# Patient Record
Sex: FEMALE | Race: BLACK OR AFRICAN AMERICAN | Employment: FULL TIME | URBAN - METROPOLITAN AREA
[De-identification: names, ages, dates, MRNs, and addresses within clinical notes are randomized per-mention and may not be internally consistent; named-entity substitution may affect disease eponyms.]

---

## 2017-04-18 ENCOUNTER — OFFICE VISIT (OUTPATIENT)
Dept: ORTHOPEDIC SURGERY | Age: 52
End: 2017-04-18

## 2017-04-18 VITALS
SYSTOLIC BLOOD PRESSURE: 147 MMHG | HEIGHT: 62 IN | WEIGHT: 216 LBS | TEMPERATURE: 97.4 F | BODY MASS INDEX: 39.75 KG/M2 | DIASTOLIC BLOOD PRESSURE: 92 MMHG | HEART RATE: 83 BPM

## 2017-04-18 DIAGNOSIS — S86.002A ACHILLES TENDON INJURY, LEFT, INITIAL ENCOUNTER: Primary | ICD-10-CM

## 2017-04-18 RX ORDER — MELOXICAM 15 MG/1
15 TABLET ORAL
Qty: 30 TAB | Refills: 0 | Status: SHIPPED | OUTPATIENT
Start: 2017-04-18 | End: 2017-04-19 | Stop reason: SDUPTHER

## 2017-04-18 NOTE — PROGRESS NOTES
AMBULATORY PROGRESS NOTE      Patient: Oj Hernandez             MRN: [id number]     SSN: [id number] Body mass index is 39.51 kg/(m^2). YOB: 1965     AGE: 45 y.o. EX: female    PCP: [employer], DO    IMPRESSION/DIAGNOSIS AND TREATMENT PLAN     DIAGNOSES  1. Achilles tendon injury, left, initial encounter        Orders Placed This Encounter    AMB SUPPLY ORDER    [20212] Ankle Min 3V    REFERRAL TO PHYSICAL THERAPY    meloxicam (MOBIC) 15 mg tablet      Oj Hernandez understands her diagnoses and the proposed plan. Plan:    1) Mobic 15 m PO every day; 30 tablets, 0 refills  2) Referral to physical therapy  3) Patient declined CAM boot until cleared by insurance    RTO - 3 weeks    HPI AND EXAMINATION     Oj Hernandez IS A 45 y.o. female who presents to my outpatient office complaining of: left foot and ankle pain. Patient states that for the past two weeks she has been having pain and swelling through the left Achilles' tendon. She rates her pain today 8/10. Patient notes that she has had plantar fasciitis in the past, and she inquired if her current pain was related to that condition. Oj Hernandez is alert/oriented (name, location, time) and follows commands well. she  is in no acute distress and her affect and mood are appropriate. Left ACHILLES GASTROCNEMIUS COMPLEX,PLANTAR FASCIA    Gait: slow with limp  Tenderness:  Yes to the non insertional region of  Achilles tendon sheath Insertional point    YES moderate tenderness to noninsertional Achilles tendon tenderness   Calf tenderness: Absent for calf or gastrocnemius muscle regions   Soft, supple, non tender, non taut lower extremity compartments   NONE to Medial Malleolar, 4/5 Met base midfoot, achilles, tib post, or   NONE to syndesmosis.          no to plantar fascia, or central calcaneal region  Deformity/Swelling:  NO  Insertional point of Distal Achilles Tendon:    YES Fusiform noninsertional focal tendinopathy   NO Comfort's Deformity Present  Cutaneous: No rashes, skin patches, wounds, or abrasions to the lower legs           Warm and Normal color. No regions of expressible drainage. Medial Border of Tibia Region: absent           Skin color, texture, turgor normal. Normal.  Joint Motion: ROM Ankle: Decreased , Hindfoot: (ST,TN,CC) Decreased, Forefoot toes: Normal  Neurologic Exam: Neuro: Motor: normal 5/5 strength in all tested muscle groups and Sensory: no sensory deficits noted. No abnormal hand/wrist, foot/ankle, or facial/neck tremors. Contractures: Gastrocnemius or Achilles Contractures absent. Joint / Tendon Stability:    No anterolateral or varus instability of the Ankle or Subtalar Joints              No peroneal tendon instability present with ankle circumduction  Alignment:  Normal Foot Alignment and  Flexible  Vascular: Normal Pulses/ NL Capillary refill, No evidence of DVT seen on physical exam.   No calf swelling, no tenderness to calf. Varicosities Lower Limbs: None  Lymphatic:  No Evidence of Lymphedema    CHART REVIEW     Past Medical History:   Diagnosis Date    Allergic rhinitis 4/30/2010    Anemia 4/30/2010    Anemia NEC     Asthma 4/30/2010    Chest pain, unspecified     GERD, CHEST WALL PAINS    Edema     R/O CHF, PULMONARY HTN     Family history of colon cancer     Mother     GERD (gastroesophageal reflux disease) 4/30/2010    History of breast lump/mass excision 4/30/2010    HTN (hypertension) 4/30/2010    Insomnia 4/30/2010    Migraine 4/30/2010    Mitral valve insufficiency and aortic valve insufficiency     TRACE TO MILD AI, MILD MR    Obesity     S/P colonoscopy 5/28/2010    S/P tonsillectomy 5/28/2010    Vitamin D deficiency 4/30/2010     Current Outpatient Prescriptions   Medication Sig    meloxicam (MOBIC) 15 mg tablet Take 1 Tab by mouth daily (with breakfast).  losartan (COZAAR) 100 mg tablet Take 1 Tab by mouth daily.     NIFEdipine ER (NIFEDICAL XL) 60 mg ER tablet Take 1 Tab by mouth daily.  topiramate (TOPAMAX) 25 mg tablet Take 1 Tab by mouth two (2) times a day.  fluticasone (VERAMYST) 27.5 mcg/actuation nasal spray 2 Sprays by Both Nostrils route daily.  cholecalciferol, vitamin D3, (VITAMIN D3) 2,000 unit tab Take 2,000 Units by mouth daily.  cetirizine (ZYRTEC) 10 mg tablet Take 1 Tab by mouth daily.  oxymetazoline (AFRIN) 0.05 % nasal spray 2 Sprays two (2) times a day.  aspirin 81 mg chewable tablet Take 81 mg by mouth daily.  albuterol (PROVENTIL, VENTOLIN) 90 mcg/actuation inhaler Take 2 Puffs by inhalation every six (6) hours as needed for Wheezing.  ondansetron hcl (ZOFRAN, AS HYDROCHLORIDE,) 8 mg tablet Take 1 Tab by mouth every eight (8) hours as needed for Nausea.  furosemide (LASIX) 20 mg tablet Take 0.5 Tabs by mouth daily.  FLAXSEED OIL PO Take  by mouth daily.  DOCOSAHEXANOIC ACID/EPA (FISH OIL PO) Take  by mouth daily.  zolpidem CR (AMBIEN CR) 12.5 mg tablet Take 1 Tab by mouth nightly as needed. No current facility-administered medications for this visit. Allergies   Allergen Reactions    Lisinopril Cough    Other Medication Other (comments)     Anesthesia-Had a hard time coming out of Anesthesia     Past Surgical History:   Procedure Laterality Date    HX BREAST BIOPSY  5-18-11    Left    HX TONSIL AND ADENOIDECTOMY       Social History     Occupational History    Not on file.      Social History Main Topics    Smoking status: Never Smoker    Smokeless tobacco: Never Used    Alcohol use Yes      Comment: light    Drug use: Yes     Special: Prescription, OTC    Sexual activity: Not on file     Family History   Problem Relation Age of Onset    Colon Cancer Mother     Hypertension Mother 52    Cancer Mother 62     Colon    Cancer Father      liver    Hypertension Father     Cancer Maternal Grandmother      stomach    Other Brother      sacoidosis    Hypertension Brother     Heart Disease Brother 43    Diabetes Brother 48    Heart Disease Sister      CAD/ICH    Breast Cancer Sister     Ovarian Cancer Sister     Breast Cancer Maternal Aunt        REVIEW OF SYSTEMS : 4/18/2017  ALL BELOW ARE Negative except : SEE HPI       Constitutional: Negative for fever, chills and weight loss. Neg Weigh Loss  Cardiovascular: Negative for chest pain, claudication and leg swelling. SOB, SALAZAR   Gastrointestinal: Negative for  pain, N/V/D/C, Blood in stool or urine,dysuria, hematuria,        Incontinence, pelvic pain  Musculoskeletal: see HPI. Neurological: Negative for dizziness and weakness. Negative for headaches,Visual Changes, Confusion, Seizures,   Psychiatric/Behavioral: Negative for depression, memory loss and substance abuse. Extremities:  Negative for  hair changes, rash or skin lesion changes. Hematologic: Negative for Bleeding problems, bruising, pallor or swollen lymph nodes. Peripheral Vascular: No calf pain, vascular vein tenderness to calf pain              No calf throbbing, posterior knee throbbing pain    DIAGNOSTIC IMAGING     ANKLE X RAYS 3 VIEWS Left   4/18/2017    NON WEIGHTBEARING X-RAYS     X RAYS AT [address] [place] [address]  4/18/2017    Bones: No fractures or dislocations. No focal osteolytic or osteoblastic process     Bone Spurs: No significant bone spurs  Alignment: Ankle mortise alignment is congruent, Tibial plafond and talar dome intact              No Osteochondral defects seen   Joint: No Significant OA changes present  Soft Tissues: Normal, No radiopaque foreign body     No abnormal calcific densities to soft tissues    No ankle joint effusion in lateral projection. Mineralization: Suggests no Osteopenia    I have personally reviewed the results of the above study.  The interpretation of this study is my professional opinion           Written by Umair Ruff, as dictated by Payal Sykes MD. IDr., Nils Loving MD, confirm that all documentation is accurate.

## 2017-04-18 NOTE — PROCEDURES
ANKLE X RAYS 3 VIEWS Left   4/18/2017    NON WEIGHTBEARING X-RAYS     X RAYS AT [address] [place] [address]  4/18/2017    Bones: No fractures or dislocations. No focal osteolytic or osteoblastic process     Bone Spurs: No significant bone spurs  Alignment: Ankle mortise alignment is congruent, Tibial plafond and talar dome intact              No Osteochondral defects seen   Joint: No Significant OA changes present  Soft Tissues: Normal, No radiopaque foreign body     No abnormal calcific densities to soft tissues    No ankle joint effusion in lateral projection. Mineralization: Suggests no Osteopenia    I have personally reviewed the results of the above study.  The interpretation of this study is my professional opinion

## 2017-04-18 NOTE — PATIENT INSTRUCTIONS
Please follow up in 3 weeks. You are advised to contact us if your condition worsens. Achilles Tendon: Exercises  Your Care Instructions  Here are some examples of exercises for your achilles tendon. Start each exercise slowly. Ease off the exercise if you start to have pain. Your doctor or physical therapist will tell you when you can start these exercises and which ones will work best for you. How to do the exercises  Toe stretch    1. Sit in a chair, and extend your affected leg so that your heel is on the floor. 2. With your hand, reach down and pull your big toe up and back. Pull toward your ankle and away from the floor. 3. Hold the position for at least 15 to 30 seconds. 4. Repeat 2 to 4 times a session, several times a day. Calf-plantar fascia stretch    1. Sit with your legs extended and knees straight. 2. Place a towel around your foot just under the toes. 3. Hold each end of the towel in each hand, with your hands above your knees. 4. Pull back with the towel so that your foot stretches toward you. 5. Hold the position for at least 15 to 30 seconds. 6. Repeat 2 to 4 times a session, up to 5 sessions a day. Floor stretch    1. Stand about 2 feet from a wall, and place your hands on the wall at about shoulder height. Or you can stand behind a chair, placing your hands on the back of it for balance. 2. Step back with the leg you want to stretch. Keep the leg straight, and press your heel into the floor with your toe turned slightly in.  3. Lean forward, and bend your other leg slightly. Feel the stretch in the Achilles tendon of your back leg. Hold for at least 15 to 30 seconds. 4. Repeat 2 to 4 times a session, up to 5 sessions a day. Stair stretch    1. Stand with the balls of both feet on the edge of a step or curb (or a medium-sized phone book). With at least one hand, hold onto something solid for balance, such as a banister or handrail.   2. Keeping your affected leg straight, slowly let that heel hang down off of the step or curb until you feel a stretch in the back of your calf and/or Achilles area. Some of your weight should still be on the other leg. 3. Hold this position for at least 15 to 30 seconds. 4. Repeat 2 to 4 times a session, up to 5 times a day or whenever your Achilles tendon starts to feel tight. This stretch can also be done with your knee slightly bent. Strength exercise    This exercise will get you started on building strength after an Achilles tendon injury. Your doctor or physical therapist can help you move on to more challenging exercises as you heal and get stronger. 1. Stand on a step with your heel off the edge of the step. Hold on to a handrail or wall for balance. 2. Push up on your toes, then slowly count to 10 as you lower yourself back down until your heel is below the step. If it hurts to push up on your toes, try putting most of your weight on your other foot as you push up, or try using your arms to help you. If you can't do this exercise without causing pain, stop the exercise and talk to your doctor. 3. Repeat the exercise 8 to 12 times, half with the knee straight and half with the knee bent. Follow-up care is a key part of your treatment and safety. Be sure to make and go to all appointments, and call your doctor if you are having problems. It's also a good idea to know your test results and keep a list of the medicines you take. Where can you learn more? Go to http://kenyatta-alisa.info/. Enter Q362 in the search box to learn more about \"Achilles Tendon: Exercises. \"  Current as of: May 23, 2016  Content Version: 11.2  © 1845-7169 [employer]. Care instructions adapted under license by [employer] (which disclaims liability or warranty for this information).  If you have questions about a medical condition or this instruction, always ask your healthcare professional. Ryder Cheung any warranty or liability for your use of this information.

## 2017-04-18 NOTE — MR AVS SNAPSHOT
Visit Information Date & Time Provider Department Dept. Phone Encounter #  
 4/18/2017  2:00 PM Sudha Millard MD [facility] [phone] [id number] Upcoming Health Maintenance Date Due Hepatitis C Screening 1965 Pneumococcal 19-64 Medium Risk (1 of 1 - PPSV23) 4/10/1982 DTaP/Tdap/Td series (1 - Tdap) 4/10/1984 FOBT Q 1 YEAR AGE 50-75 4/10/2013 PAP AKA CERVICAL CYTOLOGY 12/9/2015 INFLUENZA AGE 9 TO ADULT 8/1/2016 BREAST CANCER SCRN MAMMOGRAM 5/9/2018 Allergies as of 4/18/2017  Review Complete On: 4/18/2017 By: Henna Valverde Severity Noted Reaction Type Reactions Lisinopril  04/30/2010    Cough Other Medication    Other (comments) Anesthesia-Had a hard time coming out of Anesthesia Current Immunizations  Never Reviewed Name Date Influenza Vaccine PF 1/21/2013 Influenza Vaccine Split 10/19/2010 Not reviewed this visit You Were Diagnosed With   
  
 Codes Comments Achilles tendon injury, left, initial encounter    -  Primary ICD-10-CM: J29.727C ICD-9-CM: 257. 7 Vitals BP Pulse Temp Height(growth percentile) Weight(growth percentile) BMI  
 (!) 147/92 83 97.4 °F (36.3 °C) (Oral) 5' 2\" (1.575 m) 216 lb (98 kg) 39.51 kg/m2 OB Status Smoking Status Having regular periods Never Smoker BMI and BSA Data Body Mass Index Body Surface Area  
 39.51 kg/m 2 2.07 m 2 Preferred Pharmacy Pharmacy Name Phone [address] [facility] [phone] Your Updated Medication List  
  
   
This list is accurate as of: 4/18/17  3:09 PM.  Always use your most recent med list.  
  
  
  
  
 AFRIN (OXYMETAZOLINE) 0.05 % nasal spray Generic drug:  oxymetazoline 2 Sprays two (2) times a day. albuterol 90 mcg/actuation inhaler Commonly known as:  Bulmaro Banegas  
 Take 2 Puffs by inhalation every six (6) hours as needed for Wheezing. aspirin 81 mg chewable tablet Take 81 mg by mouth daily. cetirizine 10 mg tablet Commonly known as:  ZYRTEC Take 1 Tab by mouth daily. FISH OIL PO Take  by mouth daily. FLAXSEED OIL PO Take  by mouth daily. fluticasone 27.5 mcg/actuation nasal spray Commonly known as:  VERAMYST  
2 Sprays by Both Nostrils route daily. furosemide 20 mg tablet Commonly known as:  LASIX Take 0.5 Tabs by mouth daily. losartan 100 mg tablet Commonly known as:  COZAAR Take 1 Tab by mouth daily. NIFEdipine ER 60 mg ER tablet Commonly known as:  NIFEDICAL XL Take 1 Tab by mouth daily. ondansetron hcl 8 mg tablet Commonly known as:  ZOFRAN (AS HYDROCHLORIDE) Take 1 Tab by mouth every eight (8) hours as needed for Nausea. topiramate 25 mg tablet Commonly known as:  TOPAMAX Take 1 Tab by mouth two (2) times a day. VITAMIN D3 2,000 unit Tab Generic drug:  cholecalciferol (vitamin D3) Take 2,000 Units by mouth daily. zolpidem CR 12.5 mg tablet Commonly known as:  AMBIEN CR Take 1 Tab by mouth nightly as needed. We Performed the Following AMB POC XRAY, ANKLE; COMPLETE, 3+ VIE [16823 CPT(R)] AMB SUPPLY ORDER [[id number] Custom] Comments:  
 Order date: 4/18/2017 Left short CAM boot REFERRAL TO PHYSICAL THERAPY [HXZ80 Custom] Comments:  
 Evaluation and treatment of the left Achilles' tendon two to three times a week four four weeks. Please utilize low frequency ultrasound and gastrocnemius stretching. Referral Information Referral ID Referred By Referred To  
  
 [id number] ZORAIDA DAVE [address]   
   [address] [place] [address] [address] [place], [address] [facility] Phone: [phone] Fax: [phone] Visits Status Start Date End Date 1 New Request 4/18/17 4/18/18 If your referral has a status of pending review or denied, additional information will be sent to support the outcome of this decision. Patient Instructions Please follow up in 3 weeks. You are advised to contact us if your condition worsens. Achilles Tendon: Exercises Your Care Instructions Here are some examples of exercises for your achilles tendon. Start each exercise slowly. Ease off the exercise if you start to have pain. Your doctor or physical therapist will tell you when you can start these exercises and which ones will work best for you. How to do the exercises Toe stretch 1. Sit in a chair, and extend your affected leg so that your heel is on the floor. 2. With your hand, reach down and pull your big toe up and back. Pull toward your ankle and away from the floor. 3. Hold the position for at least 15 to 30 seconds. 4. Repeat 2 to 4 times a session, several times a day. Calf-plantar fascia stretch 1. Sit with your legs extended and knees straight. 2. Place a towel around your foot just under the toes. 3. Hold each end of the towel in each hand, with your hands above your knees. 4. Pull back with the towel so that your foot stretches toward you. 5. Hold the position for at least 15 to 30 seconds. 6. Repeat 2 to 4 times a session, up to 5 sessions a day. Floor stretch 1. Stand about 2 feet from a wall, and place your hands on the wall at about shoulder height. Or you can stand behind a chair, placing your hands on the back of it for balance. 2. Step back with the leg you want to stretch. Keep the leg straight, and press your heel into the floor with your toe turned slightly in. 
3. Lean forward, and bend your other leg slightly. Feel the stretch in the Achilles tendon of your back leg. Hold for at least 15 to 30 seconds. 4. Repeat 2 to 4 times a session, up to 5 sessions a day. Stair stretch 1. Stand with the balls of both feet on the edge of a step or curb (or a medium-sized phone book). With at least one hand, hold onto something solid for balance, such as a banister or handrail. 2. Keeping your affected leg straight, slowly let that heel hang down off of the step or curb until you feel a stretch in the back of your calf and/or Achilles area. Some of your weight should still be on the other leg. 3. Hold this position for at least 15 to 30 seconds. 4. Repeat 2 to 4 times a session, up to 5 times a day or whenever your Achilles tendon starts to feel tight. This stretch can also be done with your knee slightly bent. Strength exercise This exercise will get you started on building strength after an Achilles tendon injury. Your doctor or physical therapist can help you move on to more challenging exercises as you heal and get stronger. 1. Stand on a step with your heel off the edge of the step. Hold on to a handrail or wall for balance. 2. Push up on your toes, then slowly count to 10 as you lower yourself back down until your heel is below the step. If it hurts to push up on your toes, try putting most of your weight on your other foot as you push up, or try using your arms to help you. If you can't do this exercise without causing pain, stop the exercise and talk to your doctor. 3. Repeat the exercise 8 to 12 times, half with the knee straight and half with the knee bent. Follow-up care is a key part of your treatment and safety. Be sure to make and go to all appointments, and call your doctor if you are having problems. It's also a good idea to know your test results and keep a list of the medicines you take. Where can you learn more? Go to http://kenyatta-alisa.info/. Enter S130 in the search box to learn more about \"Achilles Tendon: Exercises. \" Current as of: May 23, 2016 Content Version: 11.2 © 2506-0233 [employer].  Care instructions adapted under license by [employer] (which disclaims liability or warranty for this information). If you have questions about a medical condition or this instruction, always ask your healthcare professional. [address] any warranty or liability for your use of this information. Introducing [address]! Dear Evaristo Zuleta: 
Thank you for requesting a [employer] account. Our records indicate that you already have an active [employer] account. You can access your account anytime at [email or web address] Did you know that you can access your hospital and ER discharge instructions at any time in [employer]? You can also review all of your test results from your hospital stay or ER visit. Additional Information If you have questions, please visit the Frequently Asked Questions section of the [employer] website at [email or web address] Remember, [employer] is NOT to be used for urgent needs. For medical emergencies, dial 911. Now available from your iPhone and Android! Please provide this summary of care documentation to your next provider. Your primary care clinician is listed as [employer]. If you have any questions after today's visit, please call [phone].

## 2017-04-19 ENCOUNTER — HOSPITAL ENCOUNTER (OUTPATIENT)
Dept: PHYSICAL THERAPY | Age: 52
Discharge: HOME | End: 2017-04-19
Payer: OTHER GOVERNMENT

## 2017-04-19 ENCOUNTER — TELEPHONE (OUTPATIENT)
Dept: ORTHOPEDIC SURGERY | Age: 52
End: 2017-04-19

## 2017-04-19 PROCEDURE — 97140 MANUAL THERAPY 1/> REGIONS: CPT

## 2017-04-19 PROCEDURE — 97162 PT EVAL MOD COMPLEX 30 MIN: CPT

## 2017-04-19 RX ORDER — MELOXICAM 15 MG/1
15 TABLET ORAL DAILY
Qty: 30 TAB | Refills: 0 | Status: CANCELLED | OUTPATIENT
Start: 2017-04-19

## 2017-04-19 RX ORDER — MELOXICAM 15 MG/1
15 TABLET ORAL DAILY
Qty: 30 TAB | Refills: 0 | Status: SHIPPED | OUTPATIENT
Start: 2017-04-19 | End: 2019-04-02

## 2017-04-19 NOTE — PROGRESS NOTES
[facility]  [address]  [place], [address]  [phone] fax    Plan of Care/ Statement of Necessity for Physical Therapy Services    Patient name: Linda Schultz Start of Care: 2017   Referral source: Prudencio Bolton MD : 1965    Medical Diagnosis: Unspecified injury of left Achilles tendon, initial encounter [S86.002A]   Onset Date:2 weeks    Treatment Diagnosis: L achilles tendinitis    Prior Hospitalization: see medical history Provider#: [id number]   Medications: Verified on Patient summary List    Comorbidities: HTN, back pain, headaches   Prior Level of Function: functionally independent with all activities     The Plan of Care and following information is based on the information from the initial evaluation. Assessment/ key information: 44 y/o female presents with signs and symptoms consistent with L achilles tendinitis. Pt demonstrates B pes planus with B over-pronation. Pt demonstrates gastroc tightness with myofascial restrictions. Decreased B ankle DF, decreased strength L PF with pain, IV and EV. + TTP noted throughout the L achilles and lateral gastroc head. Pt will benefit from PT to address the aforementioned impairments. Evaluation Complexity History MEDIUM  Complexity : 1-2 comorbidities / personal factors will impact the outcome/ POC ; Examination MEDIUM Complexity : 3 Standardized tests and measures addressing body structure, function, activity limitation and / or participation in recreation  ;Presentation MEDIUM Complexity : Evolving with changing characteristics  ; Clinical Decision Making MEDIUM Complexity : FOTO score of 26-74  Overall Complexity Rating: MEDIUM  Problem List: pain affecting function, decrease ROM, decrease strength, impaired gait/ balance, decrease ADL/ functional abilitiies, decrease activity tolerance and decrease flexibility/ joint mobility   Treatment Plan may include any combination of the following: Therapeutic exercise, Therapeutic activities, Neuromuscular re-education, Physical agent/modality, Manual therapy, Patient education and Self Care training  Patient / Family readiness to learn indicated by: asking questions and trying to perform skills  Persons(s) to be included in education: patient (P)  Barriers to Learning/Limitations: None  Patient Goal (s): To get rid of the pain  Patient Self Reported Health Status: good  Rehabilitation Potential: good    Short Term Goals: To be accomplished in 1 weeks:   1. Pt will be I and compliant with HEP  Long Term Goals: To be accomplished in 4 weeks:   1. Pt will demonstrate 5/5 gross ankle strength without increase in pain for improve ability for daily tasks   2. Improve L ankle DF to 5 degrees for improved ability for gait   3. Pt will report being able to walk between rooms at home without difficulty. 4. Pt will report at least 75% improvement for improved ability for functional tasks  Frequency / Duration: Patient to be seen 2 times per week for 4 weeks. Patient/ Caregiver education and instruction: Diagnosis, prognosis, self care, activity modification and exercises   [x]  Plan of care has been reviewed with PTA    Mayra Hyman, PT 4/19/2017 3:28 PM    ________________________________________________________________________     I certify that the above Therapy Services are being furnished while the patient is under my care. I agree with the treatment plan and certify that this therapy is necessary.     [de-identified] Signature:____________________  Date:____________Time: _________    Please sign and return to [facility]  [address]  [place], [address]  [phone] fax

## 2017-04-19 NOTE — PROGRESS NOTES
PT DAILY TREATMENT NOTE     Patient Name: Trevor Whitney  Date:2017  : 1965  [x]  Patient  Verified  Payor: Kirill Stuton / Plan: Cony Falconu / Product Type: PPO /    In time:2:40  Out time:3:20  Total Treatment Time (min): 40  Visit #: 1 of 8    Treatment Area: Unspecified injury of left Achilles tendon, initial encounter [S86.002A]    SUBJECTIVE  Pain Level (0-10 scale): 7  Any medication changes, allergies to medications, adverse drug reactions, diagnosis change, or new procedure performed?: [x] No    [] Yes (see summary sheet for update)  Subjective functional status/changes:   [] No changes reported       OBJECTIVE    Modality rationale:    Min Type Additional Details    [] Estim:  []Unatt       []IFC  []Premod                        []Other:  []w/ice   []w/heat  Position:  Location:    [] Estim: []Att    []TENS instruct  []NMES                    []Other:  []w/US   []w/ice   []w/heat  Position:  Location:    []  Traction: [] Cervical       []Lumbar                       [] Prone          []Supine                       []Intermittent   []Continuous Lbs:  [] before manual  [] after manual    []  Ultrasound: []Continuous   [] Pulsed                           []1MHz   []3MHz W/cm2:  Location:    []  Iontophoresis with dexamethasone         Location: [] Take home patch   [] In clinic    []  Ice     []  heat  []  Ice massage  []  Laser   []  Anodyne Position:  Location:    []  Laser with stim  []  Other:  Position:  Location:    []  Vasopneumatic Device Pressure:       [] lo [] med [] hi   Temperature: [] lo [] med [] hi   [] Skin assessment post-treatment:  []intact []redness- no adverse reaction    []redness - adverse reaction:     30 min [x]Eval                  []Re-Eval       10 min Manual Therapy:  Graston L gastroc and achilles   Rationale: decrease pain, increase ROM and increase tissue extensibility to perform functional tasks       With   [] TE   [] TA   [] neuro   [] other: Patient Education: [x] Review HEP    [] Progressed/Changed HEP based on:   [] positioning   [] body mechanics   [] transfers   [] heat/ice application    [] other:      Other Objective/Functional Measures:       Pain Level (0-10 scale) post treatment: 7    ASSESSMENT/Changes in Function:      Patient will continue to benefit from skilled PT services to modify and progress therapeutic interventions, address functional mobility deficits, address ROM deficits, address strength deficits, analyze and address soft tissue restrictions and analyze and cue movement patterns to attain remaining goals.      [x]  See Plan of Care  []  See progress note/recertification  []  See Discharge Summary         Progress towards goals / Updated goals:  Per POC    PLAN  []  Upgrade activities as tolerated     [x]  Continue plan of care  []  Update interventions per flow sheet       []  Discharge due to:_  []  Other:_      Mayra Browning, PT 4/19/2017  3:25 PM    Future Appointments  Date Time Provider Lakeshia Chatterjee   4/25/2017 4:30 PM Salvador Hamilton PTA [facility]   4/28/2017 5:00 PM Mayra Browning PT [facility]   5/17/2017 1:40 PM [facility]

## 2017-04-19 NOTE — TELEPHONE ENCOUNTER
Patient called to request we send the meloxicam (MOBIC) 15 mg tablet rx to [facility] pharmacy on file ([address]). She no longer uses mail order.   Please advise patient when done at [phone]

## 2017-05-08 ENCOUNTER — APPOINTMENT (OUTPATIENT)
Dept: PHYSICAL THERAPY | Age: 52
End: 2017-05-08
Payer: OTHER GOVERNMENT

## 2017-05-10 ENCOUNTER — HOSPITAL ENCOUNTER (OUTPATIENT)
Dept: PHYSICAL THERAPY | Age: 52
Discharge: HOME | End: 2017-05-10
Payer: OTHER GOVERNMENT

## 2017-05-10 PROCEDURE — 97035 APP MDLTY 1+ULTRASOUND EA 15: CPT

## 2017-05-10 PROCEDURE — 97110 THERAPEUTIC EXERCISES: CPT

## 2017-05-10 NOTE — PROGRESS NOTES
PT DAILY TREATMENT NOTE     Patient Name: Meliton Vásquez  Date:5/10/2017  : 1965  [x]  Patient  Verified  Payor: Julianna Lombard / Plan: Antoinette Dobson / Product Type: PPO /    In time:5:09  Out time:5:37  Total Treatment Time (min): 28  Visit #: 2 of 8    Treatment Area: Unspecified injury of left Achilles tendon, initial encounter [S86.002A]    SUBJECTIVE  Pain Level (0-10 scale): 8/10  Any medication changes, allergies to medications, adverse drug reactions, diagnosis change, or new procedure performed?: [x] No    [] Yes (see summary sheet for update)  Subjective functional status/changes:   [x] No changes reported    OBJECTIVE    Modality rationale: decrease inflammation, decrease pain and increase tissue extensibility to improve the patients ability to perform ADL's.    Min Type Additional Details    [] Estim:  []Unatt       []IFC  []Premod                        []Other:  []w/ice   []w/heat  Position:  Location:    [] Estim: []Att    []TENS instruct  []NMES                    []Other:  []w/US   []w/ice   []w/heat  Position:  Location:    []  Traction: [] Cervical       []Lumbar                       [] Prone          []Supine                       []Intermittent   []Continuous Lbs:  [] before manual  [] after manual   8 [x]  Ultrasound: [x]Continuous   [] Pulsed                           []1MHz   [x]3MHz W/cm2: 1.0  Location:(L) Achilles tendon    []  Iontophoresis with dexamethasone         Location: [] Take home patch   [] In clinic    []  Ice     []  heat  []  Ice massage  []  Laser   []  Anodyne Position:  Location:    []  Laser with stim  []  Other:  Position:  Location:    []  Vasopneumatic Device Pressure:       [] lo [] med [] hi   Temperature: [] lo [] med [] hi   [] Skin assessment post-treatment:  []intact []redness- no adverse reaction    []redness - adverse reaction:     20 min Therapeutic Exercise:  [x] See flow sheet :   Rationale: increase ROM and increase strength to improve the patients ability to perform ADL's. With   [x] TE   [] TA   [] neuro   [] other: Patient Education: [x] Review HEP    [] Progressed/Changed HEP based on:   [] positioning   [] body mechanics   [] transfers   [] heat/ice application    [] other:      Other Objective/Functional Measures: Pt refused warm up on stationary bike, pt stated \"I can't do any exercises because my foot hurts too much, I just want to get the US so it feels better. \" Educated pt regarding the importance of performing the exercises. Pt reports she has had cramps in her calf for several years, never sought out treatment or self treated the cramps/spasms. Pain Level (0-10 scale) post treatment: 5/10    ASSESSMENT/Changes in Function: First F/U visit since IE on April 19th. Pt reports a decrease in pain after treatment. Patient will continue to benefit from skilled PT services to modify and progress therapeutic interventions, address functional mobility deficits, address ROM deficits, address strength deficits, analyze and address soft tissue restrictions and analyze and modify body mechanics/ergonomics to attain remaining goals. [x]  See Plan of Care  []  See progress note/recertification  []  See Discharge Summary         Progress towards goals / Updated goals:  Short Term Goals: To be accomplished in 1 weeks:  1. Pt will be I and compliant with HEP - Pt reports compliance. 5/10/2017  Long Term Goals: To be accomplished in 4 weeks:  1. Pt will demonstrate 5/5 gross ankle strength without increase in pain for improve ability for daily tasks  2. Improve L ankle DF to 5 degrees for improved ability for gait  3. Pt will report being able to walk between rooms at home without difficulty. 4. Pt will report at least 75% improvement for improved ability for functional tasks  Frequency / Duration: Patient to be seen 2 times per week for 4 weeks.     PLAN  []  Upgrade activities as tolerated     [x]  Continue plan of care  [] Update interventions per flow sheet       []  Discharge due to:_  []  Other:_      Geovanni Urbina, PTA 5/10/2017  5:18 PM    Future Appointments  Date Time Provider Lakeshia Chatterjee   5/11/2017 2:30 PM [facility] RONNIE MARQUIS  [facility]   5/17/2017 1:40 PM MD Hanny Nava Farooq 67

## 2017-05-11 ENCOUNTER — HOSPITAL ENCOUNTER (OUTPATIENT)
Dept: MAMMOGRAPHY | Age: 52
Discharge: HOME | End: 2017-05-11
Attending: FAMILY MEDICINE
Payer: OTHER GOVERNMENT

## 2017-05-11 DIAGNOSIS — Z12.31 VISIT FOR SCREENING MAMMOGRAM: ICD-10-CM

## 2017-05-11 PROCEDURE — 77063 BREAST TOMOSYNTHESIS BI: CPT

## 2017-05-15 ENCOUNTER — HOSPITAL ENCOUNTER (OUTPATIENT)
Dept: PHYSICAL THERAPY | Age: 52
Discharge: HOME | End: 2017-05-15
Payer: OTHER GOVERNMENT

## 2017-05-15 PROCEDURE — 97140 MANUAL THERAPY 1/> REGIONS: CPT

## 2017-05-15 PROCEDURE — 97110 THERAPEUTIC EXERCISES: CPT

## 2017-05-15 PROCEDURE — 97035 APP MDLTY 1+ULTRASOUND EA 15: CPT

## 2017-05-15 NOTE — PROGRESS NOTES
PT DAILY TREATMENT NOTE     Patient Name: Linda Schultz  Date:5/15/2017  : 1965  [x]  Patient  Verified  Payor: Brian Troncoso / Plan: Rama Gregory / Product Type: PPO /    In time:5:06  Out time:5:40  Total Treatment Time (min): 34  Visit #: 3 of 8    Treatment Area: Unspecified injury of left Achilles tendon, initial encounter [S86.002A]    SUBJECTIVE  Pain Level (0-10 scale): 6/10  Any medication changes, allergies to medications, adverse drug reactions, diagnosis change, or new procedure performed?: [x] No    [] Yes (see summary sheet for update)  Subjective functional status/changes:   [] No changes reported  \"Still hurts, the pain is constant. \"    OBJECTIVE    Modality rationale: decrease inflammation, decrease pain and increase tissue extensibility to improve the patients ability to perform ADL's.    Min Type Additional Details    [] Estim:  []Unatt       []IFC  []Premod                        []Other:  []w/ice   []w/heat  Position:  Location:    [] Estim: []Att    []TENS instruct  []NMES                    []Other:  []w/US   []w/ice   []w/heat  Position:  Location:    []  Traction: [] Cervical       []Lumbar                       [] Prone          []Supine                       []Intermittent   []Continuous Lbs:  [] before manual  [] after manual   8 [x]  Ultrasound: [x]Continuous   [] Pulsed                           []1MHz   [x]3MHz W/cm2: 1.0  Location: (L) Achilles tendon    []  Iontophoresis with dexamethasone         Location: [] Take home patch   [] In clinic    []  Ice     []  heat  []  Ice massage  []  Laser   []  Anodyne Position:  Location:    []  Laser with stim  []  Other:  Position:  Location:    []  Vasopneumatic Device Pressure:       [] lo [] med [] hi   Temperature: [] lo [] med [] hi   [] Skin assessment post-treatment:  []intact []redness- no adverse reaction    []redness - adverse reaction:     18 min Therapeutic Exercise:  [x] See flow sheet :   Rationale: increase ROM, increase strength and increase proprioception to improve the patients ability to perform functional activities. 8 min Manual Therapy:  Graston technique to (L) gastroc, soleus, achilles tendon and plantar fascia. Rationale: decrease pain, increase ROM, increase tissue extensibility and decrease trigger points to improve activity tolerance. With   [x] TE   [] TA   [] neuro   [] other: Patient Education: [x] Review HEP    [] Progressed/Changed HEP based on:   [] positioning   [] body mechanics   [] transfers   [] heat/ice application    [] other:      Other Objective/Functional Measures: AROM (L) Ankle DF 5 degrees. Pain Level (0-10 scale) post treatment: 5/10    ASSESSMENT/Changes in Function: Pt reports limited change, states \"the pain is constant and the mm's feel very tight. \" Pt has had 2 f/u since IE. Improved DF ROM noted. Patient will continue to benefit from skilled PT services to modify and progress therapeutic interventions, address functional mobility deficits, address ROM deficits, address strength deficits, analyze and address soft tissue restrictions and analyze and modify body mechanics/ergonomics to attain remaining goals. [x]  See Plan of Care  []  See progress note/recertification  []  See Discharge Summary         Progress towards goals / Updated goals:  Short Term Goals: To be accomplished in 1 weeks:  1. Pt will be I and compliant with HEP - Pt reports compliance. 5/10/2017  Long Term Goals: To be accomplished in 4 weeks:  1. Pt will demonstrate 5/5 gross ankle strength without increase in pain for improve ability for daily tasks  2. Improve L ankle DF to 5 degrees for improved ability for gait - AROM (L) Ankle DF 5 degrees. 5/15/2017  3. Pt will report being able to walk between rooms at home without difficulty.    4. Pt will report at least 75% improvement for improved ability for functional tasks  Frequency / Duration: Patient to be seen 2 times per week for 4 weeks.     PLAN  []  Upgrade activities as tolerated     [x]  Continue plan of care  []  Update interventions per flow sheet       []  Discharge due to:_  []  Other:_      Foster Rout, PTA 5/15/2017  5:07 PM    Future Appointments  Date Time Provider Lakeshia Chatterjee   5/17/2017 1:40 PM [place] Jain [place] [id number] [facility]   5/17/2017 5:30 PM Foster Rout, PTA [facility]   5/22/2017 3:00 PM Foster Rout, PTA [facility]   5/24/2017 2:30 PM Foster Rout, PTA [facility]   5/31/2017 4:30 PM [place] Filter, PT [facility]   6/2/2017 5:00 PM [place] Filter, PT [facility]

## 2017-05-16 ENCOUNTER — DOCUMENTATION ONLY (OUTPATIENT)
Dept: SURGERY | Age: 52
End: 2017-05-16

## 2017-05-17 ENCOUNTER — APPOINTMENT (OUTPATIENT)
Dept: PHYSICAL THERAPY | Age: 52
End: 2017-05-17
Payer: OTHER GOVERNMENT

## 2017-05-22 ENCOUNTER — APPOINTMENT (OUTPATIENT)
Dept: PHYSICAL THERAPY | Age: 52
End: 2017-05-22
Payer: OTHER GOVERNMENT

## 2017-05-22 ENCOUNTER — HOSPITAL ENCOUNTER (OUTPATIENT)
Dept: ULTRASOUND IMAGING | Age: 52
Discharge: HOME | End: 2017-05-22
Attending: FAMILY MEDICINE
Payer: OTHER GOVERNMENT

## 2017-05-22 DIAGNOSIS — R92.8 ABNORMAL MAMMOGRAM: ICD-10-CM

## 2017-05-22 DIAGNOSIS — N63.0 LUMP OR MASS IN BREAST: ICD-10-CM

## 2017-05-22 PROCEDURE — 76642 ULTRASOUND BREAST LIMITED: CPT

## 2017-05-23 ENCOUNTER — OFFICE VISIT (OUTPATIENT)
Dept: ORTHOPEDIC SURGERY | Age: 52
End: 2017-05-23

## 2017-05-23 VITALS
DIASTOLIC BLOOD PRESSURE: 86 MMHG | BODY MASS INDEX: 39.01 KG/M2 | WEIGHT: 212 LBS | HEIGHT: 62 IN | TEMPERATURE: 97.8 F | SYSTOLIC BLOOD PRESSURE: 137 MMHG | HEART RATE: 80 BPM

## 2017-05-23 DIAGNOSIS — S86.002D ACHILLES TENDON INJURY, LEFT, SUBSEQUENT ENCOUNTER: ICD-10-CM

## 2017-05-23 DIAGNOSIS — M76.62 ACHILLES TENDINITIS OF LEFT LOWER EXTREMITY: Primary | ICD-10-CM

## 2017-05-23 NOTE — PROGRESS NOTES
AMBULATORY PROGRESS NOTE      Patient: Fredis Harper             MRN: [id number]     SSN: [id number] Body mass index is 38.78 kg/(m^2). YOB: 1965     AGE: 45 y.o. EX: female    PCP: Mickie Gonzalez DO    IMPRESSION/DIAGNOSIS AND TREATMENT PLAN     DIAGNOSES  1. Achilles tendinitis of left lower extremity    2. Achilles tendon injury, left, subsequent encounter        Orders Placed This Encounter    MRI ANKLE LT Lakeshia Burns understands her diagnoses and the proposed plan. Plan:    1) MRI: Left ankle; Assess the Achilles' tendon  2) Complete formal physical therapy; add three weeks    RTO - after MRI    HPI AND EXAMINATION     Fredis Harper IS A 45 y.o. female who presents to my outpatient office for follow up of a left Achilles' tendon injury. At last visit, I referred the patient to physical therapy, prescribed Mobic 15 mg, and recommended a CAM boot that was declined by the patient until cleared by insurance. The patient presents to the office today stating that she continues to have left Achilles' tendon pain. She states that three weeks ago she tried to open a window and put to much pressure on her left ankle. The next morning she went to her PCP, because she could not walk. Fredis Harper is alert/oriented (name, location, time) and follows commands well. she is in no acute distress and her affect and mood are appropriate.      Left ACHILLES GASTROCNEMIUS COMPLEX,PLANTAR FASCIA     Gait: slow with limp  Tenderness: Yes to the non insertional region of Achilles tendon sheath Insertional point    YES moderate tenderness to noninsertional Achilles tendon tenderness  Calf tenderness: Absent for calf or gastrocnemius muscle regions   Soft, supple, non tender, non taut lower extremity compartments   NONE to Medial Malleolar, 4/5 Met base midfoot, achilles, tib post, or   NONE to syndesmosis.    no to plantar fascia, or central calcaneal region  Deformity/Swelling:  NO Insertional point of Distal Achilles Tendon:    YES Fusiform noninsertional focal tendinopathy   NO Comfort's Deformity Present  Cutaneous: No rashes, skin patches, wounds, or abrasions to the lower legs   Warm and Normal color. No regions of expressible drainage. Medial Border of Tibia Region: absent   Skin color, texture, turgor normal. Normal.  Joint Motion: ROM Ankle: Decreased , Hindfoot: (ST,TN,CC) Decreased, Forefoot toes: Normal  Neurologic Exam: Neuro: Motor: normal 5/5 strength in all tested muscle groups and  Sensory: no sensory deficits noted. No abnormal hand/wrist, foot/ankle, or facial/neck tremors. Contractures: Gastrocnemius or Achilles Contractures absent. Joint / Tendon Stability:    No anterolateral or varus instability of the Ankle or Subtalar Joints   No peroneal tendon instability present with ankle circumduction  Alignment:  Normal Foot Alignment and  Flexible  Vascular: Normal Pulses/ NL Capillary refill, No evidence of DVT seen on physical exam.   No calf swelling, no tenderness to calf. Varicosities Lower Limbs: None  Lymphatic: No Evidence of Lymphedema    CHART REVIEW     Past Medical History:   Diagnosis Date    Allergic rhinitis 4/30/2010    Anemia 4/30/2010    Anemia NEC     Asthma 4/30/2010    Chest pain, unspecified     GERD, CHEST WALL PAINS    Edema     R/O CHF, PULMONARY HTN     Family history of colon cancer     Mother     GERD (gastroesophageal reflux disease) 4/30/2010    History of breast lump/mass excision 4/30/2010    HTN (hypertension) 4/30/2010    Insomnia 4/30/2010    Migraine 4/30/2010    Mitral valve insufficiency and aortic valve insufficiency     TRACE TO MILD AI, MILD MR    Obesity     S/P colonoscopy 5/28/2010    S/P tonsillectomy 5/28/2010    Vitamin D deficiency 4/30/2010     Current Outpatient Prescriptions   Medication Sig    losartan (COZAAR) 100 mg tablet Take 1 Tab by mouth daily.     NIFEdipine ER (NIFEDICAL XL) 60 mg ER tablet Take 1 Tab by mouth daily.  topiramate (TOPAMAX) 25 mg tablet Take 1 Tab by mouth two (2) times a day.  fluticasone (VERAMYST) 27.5 mcg/actuation nasal spray 2 Sprays by Both Nostrils route daily.  cholecalciferol, vitamin D3, (VITAMIN D3) 2,000 unit tab Take 2,000 Units by mouth daily.  cetirizine (ZYRTEC) 10 mg tablet Take 1 Tab by mouth daily.  oxymetazoline (AFRIN) 0.05 % nasal spray 2 Sprays two (2) times a day.  aspirin 81 mg chewable tablet Take 81 mg by mouth daily.  albuterol (PROVENTIL, VENTOLIN) 90 mcg/actuation inhaler Take 2 Puffs by inhalation every six (6) hours as needed for Wheezing.  meloxicam (MOBIC) 15 mg tablet Take 1 Tab by mouth daily.  ondansetron hcl (ZOFRAN, AS HYDROCHLORIDE,) 8 mg tablet Take 1 Tab by mouth every eight (8) hours as needed for Nausea.  furosemide (LASIX) 20 mg tablet Take 0.5 Tabs by mouth daily.  FLAXSEED OIL PO Take  by mouth daily.  DOCOSAHEXANOIC ACID/EPA (FISH OIL PO) Take  by mouth daily.  zolpidem CR (AMBIEN CR) 12.5 mg tablet Take 1 Tab by mouth nightly as needed. No current facility-administered medications for this visit. Allergies   Allergen Reactions    Lisinopril Cough    Other Medication Other (comments)     Anesthesia-Had a hard time coming out of Anesthesia     Past Surgical History:   Procedure Laterality Date    HX BREAST BIOPSY  5-18-11    Left    HX TONSIL AND ADENOIDECTOMY       Social History     Occupational History    Not on file.      Social History Main Topics    Smoking status: Never Smoker    Smokeless tobacco: Never Used    Alcohol use Yes      Comment: light    Drug use: Yes     Special: Prescription, OTC    Sexual activity: Not on file     Family History   Problem Relation Age of Onset    Colon Cancer Mother     Hypertension Mother 52    Cancer Mother 62     Colon    Cancer Father      liver    Hypertension Father     Cancer Maternal Grandmother stomach    Other Brother      sacoidosis    Hypertension Brother     Heart Disease Brother 43    Diabetes Brother 48    Heart Disease Sister      CAD/ICH    Breast Cancer Sister     Ovarian Cancer Sister     Breast Cancer Maternal Aunt        REVIEW OF SYSTEMS : 5/23/2017  ALL BELOW ARE Negative except : SEE HPI       Constitutional: Negative for fever, chills and weight loss. Neg Weigh Loss  Cardiovascular: Negative for chest pain, claudication and leg swelling. SOB, SALAZAR   Gastrointestinal: Negative for  pain, N/V/D/C, Blood in stool or urine,dysuria, hematuria,        Incontinence, pelvic pain  Musculoskeletal: see HPI. Neurological: Negative for dizziness and weakness. Negative for headaches,Visual Changes, Confusion, Seizures,   Psychiatric/Behavioral: Negative for depression, memory loss and substance abuse. Extremities:  Negative for  hair changes, rash or skin lesion changes. Hematologic: Negative for Bleeding problems, bruising, pallor or swollen lymph nodes. Peripheral Vascular: No calf pain, vascular vein tenderness to calf pain              No calf throbbing, posterior knee throbbing pain    DIAGNOSTIC IMAGING     No notes on file    Written by Elizabeth Dan, as dictated by Sandor Page MD. I, , Sandor Page MD, confirm that all documentation is accurate.

## 2017-05-23 NOTE — MR AVS SNAPSHOT
Visit Information Date & Time Provider Department Dept. Phone Encounter #  
 5/23/2017  2:50 PM Maddy Strong, 27 [facility] [phone] Upcoming Health Maintenance Date Due Hepatitis C Screening 1965 Pneumococcal 19-64 Medium Risk (1 of 1 - PPSV23) 4/10/1982 DTaP/Tdap/Td series (1 - Tdap) 4/10/1984 FOBT Q 1 YEAR AGE 50-75 4/10/2013 PAP AKA CERVICAL CYTOLOGY 12/9/2015 INFLUENZA AGE 9 TO ADULT 8/1/2017 BREAST CANCER SCRN MAMMOGRAM 5/11/2019 Allergies as of 5/23/2017  Review Complete On: 5/23/2017 By: Maddy Strong MD  
  
 Severity Noted Reaction Type Reactions Lisinopril  04/30/2010    Cough Other Medication    Other (comments) Anesthesia-Had a hard time coming out of Anesthesia Current Immunizations  Never Reviewed Name Date Influenza Vaccine PF 1/21/2013 Influenza Vaccine Split 10/19/2010 Not reviewed this visit You Were Diagnosed With   
  
 Codes Comments Achilles tendinitis of left lower extremity    -  Primary ICD-10-CM: M76.62 
ICD-9-CM: 726.71 Achilles tendon injury, left, subsequent encounter     ICD-10-CM: S86.002D ICD-9-CM: V58.89, 959.7 Vitals BP Pulse Temp Height(growth percentile) Weight(growth percentile) BMI  
 137/86 80 97.8 °F (36.6 °C) (Oral) 5' 2\" (1.575 m) 212 lb (96.2 kg) 38.78 kg/m2 OB Status Smoking Status Having regular periods Never Smoker BMI and BSA Data Body Mass Index Body Surface Area 38.78 kg/m 2 2.05 m 2 Preferred Pharmacy Pharmacy Name Phone [id number] Chaparrita Bowden, [address] [facility] [facility] [phone] Your Updated Medication List  
  
   
This list is accurate as of: 5/23/17  3:56 PM.  Always use your most recent med list.  
  
  
  
  
 AFRIN (OXYMETAZOLINE) 0.05 % nasal spray Generic drug:  oxymetazoline 2 Sprays two (2) times a day. albuterol 90 mcg/actuation inhaler Commonly known as:  Janyce [place] Take 2 Puffs by inhalation every six (6) hours as needed for Wheezing. aspirin 81 mg chewable tablet Take 81 mg by mouth daily. cetirizine 10 mg tablet Commonly known as:  ZYRTEC Take 1 Tab by mouth daily. FISH OIL PO Take  by mouth daily. FLAXSEED OIL PO Take  by mouth daily. fluticasone 27.5 mcg/actuation nasal spray Commonly known as:  VERAMYST  
2 Sprays by Both Nostrils route daily. furosemide 20 mg tablet Commonly known as:  LASIX Take 0.5 Tabs by mouth daily. losartan 100 mg tablet Commonly known as:  COZAAR Take 1 Tab by mouth daily. meloxicam 15 mg tablet Commonly known as:  MOBIC Take 1 Tab by mouth daily. NIFEdipine ER 60 mg ER tablet Commonly known as:  NIFEDICAL XL Take 1 Tab by mouth daily. ondansetron hcl 8 mg tablet Commonly known as:  ZOFRAN (AS HYDROCHLORIDE) Take 1 Tab by mouth every eight (8) hours as needed for Nausea. topiramate 25 mg tablet Commonly known as:  TOPAMAX Take 1 Tab by mouth two (2) times a day. VITAMIN D3 2,000 unit Tab Generic drug:  cholecalciferol (vitamin D3) Take 2,000 Units by mouth daily. zolpidem CR 12.5 mg tablet Commonly known as:  AMBIEN CR Take 1 Tab by mouth nightly as needed. To-Do List   
 05/24/2017 2:30 PM  
  Appointment with Salvador Hamilton PTA at [facility] [address] [place] [address] ([phone])  
  
 05/31/2017 4:30 PM  
  Appointment with Mayra Browning PT at [facility] [address] [place] [address] ([phone])  
  
 06/02/2017 5:00 PM  
  Appointment with Mayra Browning PT at [facility] [address] [place] [address] ([phone]) Patient Instructions Please follow up after MRI. You are advised to contact us if your condition worsens. A MRI has been ordered for you.  A [employer] will be contacting you to schedule the appointment as soon as it has been approved with your insurance company. Please schedule an appointment to follow up with the doctor or the physicians assistant after the MRI has been conducted. Achilles Tendon: Exercises Your Care Instructions Here are some examples of exercises for your achilles tendon. Start each exercise slowly. Ease off the exercise if you start to have pain. Your doctor or physical therapist will tell you when you can start these exercises and which ones will work best for you. How to do the exercises Toe stretch 1. Sit in a chair, and extend your affected leg so that your heel is on the floor. 2. With your hand, reach down and pull your big toe up and back. Pull toward your ankle and away from the floor. 3. Hold the position for at least 15 to 30 seconds. 4. Repeat 2 to 4 times a session, several times a day. Calf-plantar fascia stretch 1. Sit with your legs extended and knees straight. 2. Place a towel around your foot just under the toes. 3. Hold each end of the towel in each hand, with your hands above your knees. 4. Pull back with the towel so that your foot stretches toward you. 5. Hold the position for at least 15 to 30 seconds. 6. Repeat 2 to 4 times a session, up to 5 sessions a day. Floor stretch 1. Stand about 2 feet from a wall, and place your hands on the wall at about shoulder height. Or you can stand behind a chair, placing your hands on the back of it for balance. 2. Step back with the leg you want to stretch. Keep the leg straight, and press your heel into the floor with your toe turned slightly in. 
3. Lean forward, and bend your other leg slightly. Feel the stretch in the Achilles tendon of your back leg. Hold for at least 15 to 30 seconds. 4. Repeat 2 to 4 times a session, up to 5 sessions a day. Stair stretch 1. Stand with the balls of both feet on the edge of a step or curb (or a medium-sized phone book). With at least one hand, hold onto something solid for balance, such as a banister or handrail. 2. Keeping your affected leg straight, slowly let that heel hang down off of the step or curb until you feel a stretch in the back of your calf and/or Achilles area. Some of your weight should still be on the other leg. 3. Hold this position for at least 15 to 30 seconds. 4. Repeat 2 to 4 times a session, up to 5 times a day or whenever your Achilles tendon starts to feel tight. This stretch can also be done with your knee slightly bent. Strength exercise This exercise will get you started on building strength after an Achilles tendon injury. Your doctor or physical therapist can help you move on to more challenging exercises as you heal and get stronger. 1. Stand on a step with your heel off the edge of the step. Hold on to a handrail or wall for balance. 2. Push up on your toes, then slowly count to 10 as you lower yourself back down until your heel is below the step. If it hurts to push up on your toes, try putting most of your weight on your other foot as you push up, or try using your arms to help you. If you can't do this exercise without causing pain, stop the exercise and talk to your doctor. 3. Repeat the exercise 8 to 12 times, half with the knee straight and half with the knee bent. Follow-up care is a key part of your treatment and safety. Be sure to make and go to all appointments, and call your doctor if you are having problems. It's also a good idea to know your test results and keep a list of the medicines you take. Where can you learn more? Go to http://kenyatta-alisa.info/. Enter U170 in the search box to learn more about \"Achilles Tendon: Exercises. \" Current as of: May 23, 2016 Content Version: 11.2 © 6910-2487 [employer], [employer].  Care instructions adapted under license by [employer] (which disclaims liability or warranty for this information). If you have questions about a medical condition or this instruction, always ask your healthcare professional. [address] any warranty or liability for your use of this information. Introducing [facility]! Dear Mal Santana 3730: 
Thank you for requesting a [employer] account. Our records indicate that you already have an active [employer] account. You can access your account anytime at [email or web address] [email or web address] Did you know that you can access your hospital and ER discharge instructions at any time in [employer]? You can also review all of your test results from your hospital stay or ER visit. Additional Information If you have questions, please visit the Frequently Asked Questions section of the [employer] website at [email or web address] Remember, [employer] is NOT to be used for urgent needs. For medical emergencies, dial 911. Now available from your iPhone and Android! Please provide this summary of care documentation to your next provider. Your primary care clinician is listed as [employer]. If you have any questions after today's visit, please call [phone].

## 2017-05-23 NOTE — PATIENT INSTRUCTIONS
Please follow up after MRI. You are advised to contact us if your condition worsens. A MRI has been ordered for you. A [employer] will be contacting you to schedule the appointment as soon as it has been approved with your insurance company. Please schedule an appointment to follow up with the doctor or the physicians assistant after the MRI has been conducted. Achilles Tendon: Exercises  Your Care Instructions  Here are some examples of exercises for your achilles tendon. Start each exercise slowly. Ease off the exercise if you start to have pain. Your doctor or physical therapist will tell you when you can start these exercises and which ones will work best for you. How to do the exercises  Toe stretch    1. Sit in a chair, and extend your affected leg so that your heel is on the floor. 2. With your hand, reach down and pull your big toe up and back. Pull toward your ankle and away from the floor. 3. Hold the position for at least 15 to 30 seconds. 4. Repeat 2 to 4 times a session, several times a day. Calf-plantar fascia stretch    1. Sit with your legs extended and knees straight. 2. Place a towel around your foot just under the toes. 3. Hold each end of the towel in each hand, with your hands above your knees. 4. Pull back with the towel so that your foot stretches toward you. 5. Hold the position for at least 15 to 30 seconds. 6. Repeat 2 to 4 times a session, up to 5 sessions a day. Floor stretch    1. Stand about 2 feet from a wall, and place your hands on the wall at about shoulder height. Or you can stand behind a chair, placing your hands on the back of it for balance. 2. Step back with the leg you want to stretch. Keep the leg straight, and press your heel into the floor with your toe turned slightly in.  3. Lean forward, and bend your other leg slightly. Feel the stretch in the Achilles tendon of your back leg. Hold for at least 15 to 30 seconds.   4. Repeat 2 to 4 times a session, up to 5 sessions a day. Stair stretch    1. Stand with the balls of both feet on the edge of a step or curb (or a medium-sized phone book). With at least one hand, hold onto something solid for balance, such as a banister or handrail. 2. Keeping your affected leg straight, slowly let that heel hang down off of the step or curb until you feel a stretch in the back of your calf and/or Achilles area. Some of your weight should still be on the other leg. 3. Hold this position for at least 15 to 30 seconds. 4. Repeat 2 to 4 times a session, up to 5 times a day or whenever your Achilles tendon starts to feel tight. This stretch can also be done with your knee slightly bent. Strength exercise    This exercise will get you started on building strength after an Achilles tendon injury. Your doctor or physical therapist can help you move on to more challenging exercises as you heal and get stronger. 1. Stand on a step with your heel off the edge of the step. Hold on to a handrail or wall for balance. 2. Push up on your toes, then slowly count to 10 as you lower yourself back down until your heel is below the step. If it hurts to push up on your toes, try putting most of your weight on your other foot as you push up, or try using your arms to help you. If you can't do this exercise without causing pain, stop the exercise and talk to your doctor. 3. Repeat the exercise 8 to 12 times, half with the knee straight and half with the knee bent. Follow-up care is a key part of your treatment and safety. Be sure to make and go to all appointments, and call your doctor if you are having problems. It's also a good idea to know your test results and keep a list of the medicines you take. Where can you learn more? Go to http://kenyatta-alisa.info/. Enter L135 in the search box to learn more about \"Achilles Tendon: Exercises. \"  Current as of: May 23, 2016  Content Version: 11.2  © 5984-4123 [employer]. Care instructions adapted under license by [employer] (which disclaims liability or warranty for this information). If you have questions about a medical condition or this instruction, always ask your healthcare professional. [address] any warranty or liability for your use of this information.

## 2017-05-31 ENCOUNTER — APPOINTMENT (OUTPATIENT)
Dept: PHYSICAL THERAPY | Age: 52
End: 2017-05-31
Payer: OTHER GOVERNMENT

## 2017-06-09 NOTE — PROGRESS NOTES
[facility]  [address]  [place], [address]  [phone] fax    Physical Therapy Discharge Summary  Patient name: Shaun Espinal Start of Care: 2017   Referral source: Max Bowen MD : 1965    Medical Diagnosis: Unspecified injury of left Achilles tendon, initial encounter [S86.002A] Onset Date:2 weeks    Treatment Diagnosis: L achilles tendinitis    Prior Hospitalization: see medical history Provider#: [id number]   Medications: Verified on Patient summary List   Comorbidities: HTN, back pain, headaches  Prior Level of Function: functionally independent with all activities        Visits from Start of Care: 3    Missed Visits: 4  Reporting Period : 17 to 5-15-17      Summary of Care:   Unable to further assess progress towards goals at this time due to non-compliance/lack of attendance. DC at this time with no further instructions to the patient. Thank you for this referral.  Progress towards goals:  Short Term Goals: To be accomplished in 1 weeks:  1. Pt will be I and compliant with HEP - Pt reports compliance. 5/10/2017  Long Term Goals: To be accomplished in 4 weeks:  1. Pt will demonstrate 5/5 gross ankle strength without increase in pain for improve ability for daily tasks  2. Improve L ankle DF to 5 degrees for improved ability for gait - AROM (L) Ankle DF 5 degrees. 5/15/2017  3. Pt will report being able to walk between rooms at home without difficulty.    4. Pt will report at least 75% improvement for improved ability for functional tasks           ASSESSMENT/RECOMMENDATIONS:  [x]Discontinue therapy: []Patient has reached or is progressing toward set goals      [x]Patient is non-compliant or has abdicated      []Due to lack of appreciable progress towards set goals    Mayra Hyman, PT 2017 10:27 AM

## 2018-05-18 ENCOUNTER — HOSPITAL ENCOUNTER (OUTPATIENT)
Dept: MAMMOGRAPHY | Age: 53
Discharge: HOME | End: 2018-05-18
Attending: OBSTETRICS & GYNECOLOGY
Payer: COMMERCIAL

## 2018-05-18 DIAGNOSIS — Z12.31 VISIT FOR SCREENING MAMMOGRAM: ICD-10-CM

## 2018-05-18 PROCEDURE — 77063 BREAST TOMOSYNTHESIS BI: CPT

## 2019-04-02 ENCOUNTER — APPOINTMENT (OUTPATIENT)
Dept: ULTRASOUND IMAGING | Age: 54
End: 2019-04-02
Attending: PHYSICIAN ASSISTANT
Payer: COMMERCIAL

## 2019-04-02 ENCOUNTER — HOSPITAL ENCOUNTER (EMERGENCY)
Age: 54
Discharge: HOME | End: 2019-04-02
Attending: EMERGENCY MEDICINE | Admitting: EMERGENCY MEDICINE
Payer: COMMERCIAL

## 2019-04-02 ENCOUNTER — APPOINTMENT (OUTPATIENT)
Dept: CT IMAGING | Age: 54
End: 2019-04-02
Attending: PHYSICIAN ASSISTANT
Payer: COMMERCIAL

## 2019-04-02 VITALS
HEIGHT: 61 IN | TEMPERATURE: 99.2 F | WEIGHT: 209 LBS | HEART RATE: 77 BPM | RESPIRATION RATE: 16 BRPM | SYSTOLIC BLOOD PRESSURE: 119 MMHG | BODY MASS INDEX: 39.46 KG/M2 | DIASTOLIC BLOOD PRESSURE: 74 MMHG | OXYGEN SATURATION: 97 %

## 2019-04-02 DIAGNOSIS — B17.9 ACUTE HEPATITIS: Primary | ICD-10-CM

## 2019-04-02 LAB
ALBUMIN SERPL-MCNC: 3.4 G/DL (ref 3.4–5)
ALBUMIN/GLOB SERPL: 0.8 {RATIO} (ref 0.8–1.7)
ALP SERPL-CCNC: 261 U/L (ref 45–117)
ALT SERPL-CCNC: 1048 U/L (ref 13–56)
ANION GAP SERPL CALC-SCNC: 8 MMOL/L (ref 3–18)
AST SERPL-CCNC: 298 U/L (ref 15–37)
BASOPHILS # BLD: 0 K/UL (ref 0–0.1)
BASOPHILS NFR BLD: 0 % (ref 0–2)
BILIRUB SERPL-MCNC: 0.6 MG/DL (ref 0.2–1)
BUN SERPL-MCNC: 16 MG/DL (ref 7–18)
BUN/CREAT SERPL: 12 (ref 12–20)
CALCIUM SERPL-MCNC: 8.8 MG/DL (ref 8.5–10.1)
CHLORIDE SERPL-SCNC: 101 MMOL/L (ref 100–108)
CK MB CFR SERPL CALC: NORMAL % (ref 0–4)
CK MB SERPL-MCNC: <1 NG/ML (ref 5–25)
CK SERPL-CCNC: 109 U/L (ref 26–192)
CO2 SERPL-SCNC: 30 MMOL/L (ref 21–32)
CREAT SERPL-MCNC: 1.39 MG/DL (ref 0.6–1.3)
DIFFERENTIAL METHOD BLD: ABNORMAL
EOSINOPHIL # BLD: 0.1 K/UL (ref 0–0.4)
EOSINOPHIL NFR BLD: 1 % (ref 0–5)
ERYTHROCYTE [DISTWIDTH] IN BLOOD BY AUTOMATED COUNT: 15.4 % (ref 11.6–14.5)
GLOBULIN SER CALC-MCNC: 4.5 G/DL (ref 2–4)
GLUCOSE SERPL-MCNC: 80 MG/DL (ref 74–99)
HCT VFR BLD AUTO: 35.8 % (ref 35–45)
HGB BLD-MCNC: 11.7 G/DL (ref 12–16)
LIPASE SERPL-CCNC: 210 U/L (ref 73–393)
LYMPHOCYTES # BLD: 2.9 K/UL (ref 0.9–3.6)
LYMPHOCYTES NFR BLD: 37 % (ref 21–52)
MCH RBC QN AUTO: 26.5 PG (ref 24–34)
MCHC RBC AUTO-ENTMCNC: 32.7 G/DL (ref 31–37)
MCV RBC AUTO: 81 FL (ref 74–97)
MONOCYTES # BLD: 1.5 K/UL (ref 0.05–1.2)
MONOCYTES NFR BLD: 19 % (ref 3–10)
NEUTS SEG # BLD: 3.4 K/UL (ref 1.8–8)
NEUTS SEG NFR BLD: 43 % (ref 40–73)
PLATELET # BLD AUTO: 375 K/UL (ref 135–420)
PLATELET COMMENTS,PCOM: ABNORMAL
PMV BLD AUTO: 10.1 FL (ref 9.2–11.8)
POTASSIUM SERPL-SCNC: 3.6 MMOL/L (ref 3.5–5.5)
PROT SERPL-MCNC: 7.9 G/DL (ref 6.4–8.2)
RBC # BLD AUTO: 4.42 M/UL (ref 4.2–5.3)
RBC MORPH BLD: ABNORMAL
SODIUM SERPL-SCNC: 139 MMOL/L (ref 136–145)
TROPONIN I SERPL-MCNC: <0.02 NG/ML (ref 0–0.04)
WBC # BLD AUTO: 7.9 K/UL (ref 4.6–13.2)

## 2019-04-02 PROCEDURE — 76705 ECHO EXAM OF ABDOMEN: CPT

## 2019-04-02 PROCEDURE — 96376 TX/PRO/DX INJ SAME DRUG ADON: CPT

## 2019-04-02 PROCEDURE — 74011250636 HC RX REV CODE- 250/636

## 2019-04-02 PROCEDURE — 74011636320 HC RX REV CODE- 636/320: Performed by: EMERGENCY MEDICINE

## 2019-04-02 PROCEDURE — 80053 COMPREHEN METABOLIC PANEL: CPT

## 2019-04-02 PROCEDURE — 96361 HYDRATE IV INFUSION ADD-ON: CPT

## 2019-04-02 PROCEDURE — 99281 EMR DPT VST MAYX REQ PHY/QHP: CPT

## 2019-04-02 PROCEDURE — 96375 TX/PRO/DX INJ NEW DRUG ADDON: CPT

## 2019-04-02 PROCEDURE — 82550 ASSAY OF CK (CPK): CPT

## 2019-04-02 PROCEDURE — 80074 ACUTE HEPATITIS PANEL: CPT

## 2019-04-02 PROCEDURE — 83690 ASSAY OF LIPASE: CPT

## 2019-04-02 PROCEDURE — 74011000258 HC RX REV CODE- 258: Performed by: PHYSICIAN ASSISTANT

## 2019-04-02 PROCEDURE — 96374 THER/PROPH/DIAG INJ IV PUSH: CPT

## 2019-04-02 PROCEDURE — 74011250636 HC RX REV CODE- 250/636: Performed by: PHYSICIAN ASSISTANT

## 2019-04-02 PROCEDURE — 85025 COMPLETE CBC W/AUTO DIFF WBC: CPT

## 2019-04-02 PROCEDURE — 74177 CT ABD & PELVIS W/CONTRAST: CPT

## 2019-04-02 RX ORDER — MORPHINE SULFATE 4 MG/ML
4 INJECTION INTRAVENOUS
Status: COMPLETED | OUTPATIENT
Start: 2019-04-02 | End: 2019-04-02

## 2019-04-02 RX ORDER — MORPHINE SULFATE 4 MG/ML
INJECTION INTRAVENOUS
Status: COMPLETED
Start: 2019-04-02 | End: 2019-04-02

## 2019-04-02 RX ORDER — ONDANSETRON 2 MG/ML
4 INJECTION INTRAMUSCULAR; INTRAVENOUS
Status: COMPLETED | OUTPATIENT
Start: 2019-04-02 | End: 2019-04-02

## 2019-04-02 RX ORDER — ONDANSETRON 4 MG/1
TABLET, ORALLY DISINTEGRATING ORAL
Qty: 10 TAB | Refills: 0 | Status: SHIPPED | OUTPATIENT
Start: 2019-04-02

## 2019-04-02 RX ADMIN — PIPERACILLIN SODIUM,TAZOBACTAM SODIUM 3.38 G: 3; .375 INJECTION, POWDER, FOR SOLUTION INTRAVENOUS at 15:23

## 2019-04-02 RX ADMIN — MORPHINE SULFATE 4 MG: 4 INJECTION INTRAVENOUS at 15:23

## 2019-04-02 RX ADMIN — IOPAMIDOL 70 ML: 612 INJECTION, SOLUTION INTRAVENOUS at 17:41

## 2019-04-02 RX ADMIN — ONDANSETRON 4 MG: 2 INJECTION INTRAMUSCULAR; INTRAVENOUS at 15:11

## 2019-04-02 RX ADMIN — ONDANSETRON HYDROCHLORIDE 4 MG: 2 SOLUTION INTRAMUSCULAR; INTRAVENOUS at 18:38

## 2019-04-02 RX ADMIN — SODIUM CHLORIDE 1000 ML: 900 INJECTION, SOLUTION INTRAVENOUS at 15:23

## 2019-04-02 NOTE — DISCHARGE INSTRUCTIONS
Please return immediately to the Emergency Room for re-evaluation if you are not improving, develop any new symptoms, or develop worsening of current symptoms! If you have been prescribed a medication and are unable to take this medication for any reason, please return to the Emergency Department for further evaluation! If you have been referred for follow-up to a specialist, but are unable to follow-up and your symptoms are either not improving or are worsening, please return to the Emergency Department for further evaluation!

## 2019-04-02 NOTE — LETTER
NOTIFICATION OF RETURN TO WORK 
 
4/2/2019 7:07 PM 
 
Ms. Phoebe Morris [address] [address] [address] [address] [facility] To Whom It May Concern: 
 
Phoebe Morris was under the care of [facility] EMERGENCY DEPT. She will be able to return to work on Thursday, 4/4/19. If there are questions or concerns please have the patient contact our office. Sincerely, Jacquie Xiong PA-C

## 2019-04-02 NOTE — ED PROVIDER NOTES
EMERGENCY DEPARTMENT HISTORY AND PHYSICAL EXAM 
 
4:27 PM 
 
 
Date: 4/2/2019 Patient Name: Ishaan Area History of Presenting Illness Chief Complaint Patient presents with  Abdominal Pain  Nausea History Provided By: Patient Chief Complaint: Epigastric abdominal pain, diaphoresis, nausea without vomiting, chills Duration: 1 Weeks Timing:  Acute Location:  
Quality: Aching Severity: 8 out of 10 Modifying Factors: none Associated Symptoms: denies any other associated signs or symptoms Additional History (Context):Jessica Olsen is a 52 y.o. female with a pertinent history of HTN, GERD, asthma, anemia who presents to the emergency department for evaluation of epigastric abdominal pain, sweating at night, nausea without vomiting, and chills for 1 week. Patient was seen at her primary care doctor's office and had labs drawn. She was called today and told that her liver function enzymes were significantly elevated and that she needed to come to the ER for a CT. Patient states she is not actually checked her temperature, but has had an excessive amount of sweating at home and has been unable to get dressed as a result. No urinary symptoms, lower abdominal pain, chest pain, shortness of breath, cough, upper respiratory symptoms, diarrhea, constipation, leg swelling, or rash. Patient denies taking Tylenol over-the-counter. She states she drinks 1-2 glasses of wine daily. She denies any recent travel out of the country. She admits to eating raw sushi, but denies consumption of any other raw food. PCP:  Corey Rodriguez MD 
 
 
Current Facility-Administered Medications Medication Dose Route Frequency Provider Last Rate Last Dose  piperacillin-tazobactam (ZOSYN) 3.375 g in 0.9% sodium chloride (MBP/ADV) 100 mL MBP  3.375 g IntraVENous Q6H Chente Cosby PA-C   Stopped at 04/02/19 1600 Current Outpatient Medications Medication Sig Dispense Refill  ondansetron (ZOFRAN ODT) 4 mg disintegrating tablet Take 1-2 tablets every 6-8 hours as needed for nausea and vomiting. 10 Tab 0  
 ondansetron hcl (ZOFRAN, AS HYDROCHLORIDE,) 8 mg tablet Take 1 Tab by mouth every eight (8) hours as needed for Nausea. 30 Tab 0  
 losartan (COZAAR) 100 mg tablet Take 1 Tab by mouth daily. 90 Tab 0  
 NIFEdipine ER (NIFEDICAL XL) 60 mg ER tablet Take 1 Tab by mouth daily. 90 Tab 0  
 topiramate (TOPAMAX) 25 mg tablet Take 1 Tab by mouth two (2) times a day. 120 Tab 0  
 fluticasone (VERAMYST) 27.5 mcg/actuation nasal spray 2 Sprays by Both Nostrils route daily. 1 Bottle 1  cholecalciferol, vitamin D3, (VITAMIN D3) 2,000 unit tab Take 2,000 Units by mouth daily.  cetirizine (ZYRTEC) 10 mg tablet Take 1 Tab by mouth daily. 90 Tab 2  
 oxymetazoline (AFRIN) 0.05 % nasal spray 2 Sprays two (2) times a day.  aspirin 81 mg chewable tablet Take 81 mg by mouth daily.  albuterol (PROVENTIL, VENTOLIN) 90 mcg/actuation inhaler Take 2 Puffs by inhalation every six (6) hours as needed for Wheezing. 1 g 3 Past History Past Medical History: 
Past Medical History:  
Diagnosis Date  Allergic rhinitis 4/30/2010  Anemia 4/30/2010  Anemia NEC  Asthma 4/30/2010  Chest pain, unspecified GERD, CHEST WALL PAINS  Edema R/O CHF, PULMONARY HTN   
 Family history of colon cancer Mother  GERD (gastroesophageal reflux disease) 4/30/2010  
 History of breast lump/mass excision 4/30/2010  
 HTN (hypertension) 4/30/2010  Insomnia 4/30/2010  Migraine 4/30/2010  Mitral valve insufficiency and aortic valve insufficiency TRACE TO MILD AI, MILD MR  
 Obesity  S/P colonoscopy 5/28/2010  S/P tonsillectomy 5/28/2010  Vitamin D deficiency 4/30/2010 Past Surgical History: 
Past Surgical History:  
Procedure Laterality Date  HX BREAST BIOPSY  5-18-11 Left  HX TONSIL AND ADENOIDECTOMY Family History: Family History Problem Relation Age of Onset  Colon Cancer Mother  Hypertension Mother 52  
 Cancer Mother 62 Colon  Cancer Father   
     liver  Hypertension Father  Cancer Maternal Grandmother   
     stomach  Other Brother   
     sacoidosis  Hypertension Brother  Heart Disease Brother 43  
 Diabetes Brother 48  
 Heart Disease Sister CAD/ICH  Breast Cancer Sister  Ovarian Cancer Sister  Breast Cancer Maternal Aunt Social History: 
Social History Tobacco Use  Smoking status: Never Smoker  Smokeless tobacco: Never Used Substance Use Topics  Alcohol use: Yes Comment: light  Drug use: Yes Types: Prescription, OTC Allergies: Allergies Allergen Reactions  Lisinopril Cough  Other Medication Other (comments) Anesthesia-Had a hard time coming out of Anesthesia Review of Systems Review of Systems Constitutional: Negative for chills and fever. HENT: Negative for congestion, rhinorrhea and sore throat. Respiratory: Negative for cough and shortness of breath. Cardiovascular: Negative for chest pain. Gastrointestinal: Positive for abdominal pain and nausea. Negative for blood in stool, constipation, diarrhea and vomiting. Genitourinary: Negative for dysuria, frequency and hematuria. Musculoskeletal: Negative for back pain and myalgias. Skin: Negative for rash and wound. Neurological: Negative for dizziness and headaches. Physical Exam  
 
Visit Vitals /74 Pulse 77 Temp 99.2 °F (37.3 °C) Resp 16 Ht 5' 1\" (1.549 m) Wt 94.8 kg (209 lb) LMP 03/16/2019 SpO2 97% BMI 39.49 kg/m² Physical Exam  
Constitutional: She is oriented to person, place, and time. She appears well-developed and well-nourished. No distress. HENT:  
Head: Normocephalic and atraumatic. Nose: Nose normal.  
Mouth/Throat: Oropharynx is clear and moist. No oropharyngeal exudate. Eyes: Pupils are equal, round, and reactive to light. Conjunctivae and EOM are normal.  
Neck: Normal range of motion. Neck supple. No thyromegaly present. Cardiovascular: Normal rate, regular rhythm and normal heart sounds. Pulmonary/Chest: Effort normal and breath sounds normal. No respiratory distress. She has no wheezes. She has no rales. She exhibits no tenderness. Abdominal: Soft. Bowel sounds are normal. She exhibits no distension. There is tenderness. There is no rebound and no guarding. Tenderness to deep palpation noted to epigastric and RUQ regions. No rebound, rigidity, guarding, distention, or mass noted. (-) Beck's sign. Normoactive BS all four quadrants. Musculoskeletal: She exhibits no edema or deformity. Lymphadenopathy:  
  She has no cervical adenopathy. Neurological: She is alert and oriented to person, place, and time. She has normal reflexes. Skin: Skin is warm and dry. She is not diaphoretic. Psychiatric: She has a normal mood and affect. Nursing note and vitals reviewed. Diagnostic Study Results Labs - Recent Results (from the past 12 hour(s)) CBC WITH AUTOMATED DIFF Collection Time: 04/02/19  3:20 PM  
Result Value Ref Range WBC 7.9 4.6 - 13.2 K/uL  
 RBC 4.42 4.20 - 5.30 M/uL  
 HGB 11.7 (L) 12.0 - 16.0 g/dL HCT 35.8 35.0 - 45.0 % MCV 81.0 74.0 - 97.0 FL  
 MCH 26.5 24.0 - 34.0 PG  
 MCHC 32.7 31.0 - 37.0 g/dL  
 RDW 15.4 (H) 11.6 - 14.5 % PLATELET 840 776 - 076 K/uL MPV 10.1 9.2 - 11.8 FL  
 NEUTROPHILS 43 40 - 73 % LYMPHOCYTES 37 21 - 52 % MONOCYTES 19 (H) 3 - 10 % EOSINOPHILS 1 0 - 5 % BASOPHILS 0 0 - 2 %  
 ABS. NEUTROPHILS 3.4 1.8 - 8.0 K/UL  
 ABS. LYMPHOCYTES 2.9 0.9 - 3.6 K/UL  
 ABS. MONOCYTES 1.5 (H) 0.05 - 1.2 K/UL  
 ABS. EOSINOPHILS 0.1 0.0 - 0.4 K/UL  
 ABS. BASOPHILS 0.0 0.0 - 0.1 K/UL  
 DF MANUAL  PLATELET COMMENTS ADEQUATE PLATELETS    
 RBC COMMENTS ANISOCYTOSIS 
1+ 
    
 METABOLIC PANEL, COMPREHENSIVE Collection Time: 04/02/19  3:20 PM  
Result Value Ref Range Sodium 139 136 - 145 mmol/L Potassium 3.6 3.5 - 5.5 mmol/L Chloride 101 100 - 108 mmol/L  
 CO2 30 21 - 32 mmol/L Anion gap 8 3.0 - 18 mmol/L Glucose 80 74 - 99 mg/dL BUN 16 7.0 - 18 MG/DL Creatinine 1.39 (H) 0.6 - 1.3 MG/DL  
 BUN/Creatinine ratio 12 12 - 20 GFR est AA 48 (L) >60 ml/min/1.73m2 GFR est non-AA 39 (L) >60 ml/min/1.73m2 Calcium 8.8 8.5 - 10.1 MG/DL Bilirubin, total 0.6 0.2 - 1.0 MG/DL  
 ALT (SGPT) 1,048 (H) 13 - 56 U/L  
 AST (SGOT) 298 (H) 15 - 37 U/L Alk. phosphatase 261 (H) 45 - 117 U/L Protein, total 7.9 6.4 - 8.2 g/dL Albumin 3.4 3.4 - 5.0 g/dL Globulin 4.5 (H) 2.0 - 4.0 g/dL A-G Ratio 0.8 0.8 - 1.7 LIPASE Collection Time: 04/02/19  3:20 PM  
Result Value Ref Range Lipase 210 73 - 393 U/L  
CARDIAC PANEL,(CK, CKMB & TROPONIN) Collection Time: 04/02/19  3:20 PM  
Result Value Ref Range  26 - 192 U/L  
 CK - MB <1.0 <3.6 ng/ml CK-MB Index  0.0 - 4.0 % CALCULATION NOT PERFORMED WHEN RESULT IS BELOW LINEAR LIMIT Troponin-I, QT <0.02 0.0 - 0.045 NG/ML Radiologic Studies - Ct Abd Pelv W Cont Result Date: 4/2/2019 CT ABDOMEN AND PELVIS WITH ENHANCEMENT INDICATION: One week of abdominal pain, elevated liver enzymes. TECHNIQUE: Axial images obtained of the abdomen and pelvis following the uneventful administration of  70 cc's Isovue-300 nonionic intravenous contrast. Coronal and sagittal reformatted images of the abdomen and pelvis were obtained.  All CT scans at this facility are performed using dose optimization technique as appropriate to a performed exam, to include automated exposure control, adjustment of the mA and/or kV according to patient size (including appropriate matching first site-specific examinations), or use of iterative reconstruction technique. COMPARISON: Limited abdominal ultrasound same day. ABDOMEN FINDINGS: Liver: Unremarkable. Spleen: Unremarkable. Pancreas: Unremarkable. Biliary: Unremarkable. Bowel: Unremarkable. Appendix is normal. Diastases umbilical region containing bowel loops. Peritoneum/ Retroperitoneum: Unremarkable. Lymph Nodes: Unremarkable. Adrenal Glands: Unremarkable. Kidneys: Unremarkable. Vessels: Unremarkable for age. PELVIS FINDINGS: Bladder/ Pelvic Organs: Unremarkable. Lung Base: Unremarkable. Bones: Unremarkable for age. Mabeline Sink IMPRESSION:  No acute findings. Diastases umbilical region containing bowel loops. [id number] Result Date: 4/2/2019 ULTRASOUND ABDOMEN LIMITED INDICATION: Transaminitis, fever, abdominal pain COMPARISON: Ultrasound abdomen 6/15/2010 TECHNIQUE: Static images from sonographic evaluation of the right upper quadrant are submitted for review. FINDINGS: Liver: Increased echogenicity of the liver parenchyma. The liver is normal in size, measuring 15.5cm. No hepatic mass. The portal vein demonstrates hepatopetal flow. Biliary: The gallbladder is without evidence for cholelithiasis, wall thickening, or pericholecystic fluid. The patient demonstrated negative sonographic Beck sign. The common bile duct measures 5.3mm. No intrahepatic biliary ductal dilatation. Pancreas: The visualized portion of the pancreas is unremarkable. Right Kidney: Normal echotexture and normal size, measuring 12.3 x 6.0 x 5.5 cm. No evidence for solid renal mass, hydronephrosis, or nephrolithiasis. Vascular: The visualized portion of the aorta is nonaneurysmal.  The proximal aorta measures 2.21cm, the mid aorta measures 1.99cm, and the distal aorta measures 1.45cm. The visualized portion of the IVC is unremarkable. Other: No evidence for ascites. IMPRESSION: 1.  Increased echogenicity of the liver parenchyma, which is nonspecific but may represent hepatic steatosis or other nonspecific hepatocellular disease. 2. Otherwise, no acute finding. Medical Decision Making I am the first provider for this patient. I reviewed the vital signs, available nursing notes, past medical history, past surgical history, family history and social history. Vital Signs-Reviewed the patient's vital signs. Pulse Oximetry Analysis -  100% on room air (Interpretation) Records Reviewed: Nursing Notes and Old Medical Records (Time of Review: 4:27 PM) ED Course: Progress Notes, Reevaluation, and Consults: 
 
Provider Notes (Medical Decision Making):  
differential diagnosis: Acute cholecystitis, biliary colic, gastritis, peptic ulcer disease, acute gastroenteritis, unlikely ACS Plan: Patient presents ambulatory no acute distress with normal vitals. Exam of abdomen reveals tenderness to right upper quadrant and epigastric region with negative Beck sign. Otherwise unremarkable at abdominal exam.  No URI symptoms. Labs are remarkable for significantly elevated LFTs with normal T bili. Ultrasound reveals increased echogenicity of the liver without acute gallbladder pathology. CT abdomen is also negative. Discussed case with Dr. Howard Alcala, who recommends adding a hepatitis panel. Patient may follow-up with GI and PCP. Will DC home with Zofran. At this time, patient is stable and appropriate for discharge home. Patient demonstrates understanding of current diagnoses and is in agreement with the treatment plan. They are advised that while the likelihood of serious underlying condition is low at this point given the evaluation performed today, we cannot fully rule it out. They are advised to immediately return with any new symptoms or worsening of current condition. All questions have been answered. Patient is given educational material regarding their diagnoses, including danger symptoms and when to return to the ED. Diagnosis Clinical Impression: 1. Acute hepatitis Disposition: [address] Follow-up Information Follow up With Specialties Details Why Contact Info Norma Moreno MD Gastroenterology, Internal Medicine Call For follow-up [address] [place] [address] [address] [facility] 
[phone] [address] [facility] EMERGENCY DEPT Emergency Medicine Go to As needed, If symptoms worsen Carmina Corral [id number] [phone] Patient's Medications Start Taking ONDANSETRON (ZOFRAN ODT) 4 MG DISINTEGRATING TABLET    Take 1-2 tablets every 6-8 hours as needed for nausea and vomiting. Continue Taking ALBUTEROL (PROVENTIL, VENTOLIN) 90 MCG/ACTUATION INHALER    Take 2 Puffs by inhalation every six (6) hours as needed for Wheezing. ASPIRIN 81 MG CHEWABLE TABLET    Take 81 mg by mouth daily. CETIRIZINE (ZYRTEC) 10 MG TABLET    Take 1 Tab by mouth daily. CHOLECALCIFEROL, VITAMIN D3, (VITAMIN D3) 2,000 UNIT TAB    Take 2,000 Units by mouth daily. FLUTICASONE (VERAMYST) 27.5 MCG/ACTUATION NASAL SPRAY    2 Sprays by Both Nostrils route daily. LOSARTAN (COZAAR) 100 MG TABLET    Take 1 Tab by mouth daily. NIFEDIPINE ER (NIFEDICAL XL) 60 MG ER TABLET    Take 1 Tab by mouth daily. ONDANSETRON HCL (ZOFRAN, AS HYDROCHLORIDE,) 8 MG TABLET    Take 1 Tab by mouth every eight (8) hours as needed for Nausea. OXYMETAZOLINE (AFRIN) 0.05 % NASAL SPRAY    2 Sprays two (2) times a day. TOPIRAMATE (TOPAMAX) 25 MG TABLET    Take 1 Tab by mouth two (2) times a day. These Medications have changed No medications on file Stop Taking DOCOSAHEXANOIC ACID/EPA (FISH OIL PO)    Take  by mouth daily. FLAXSEED OIL PO    Take  by mouth daily. FUROSEMIDE (LASIX) 20 MG TABLET    Take 0.5 Tabs by mouth daily. MELOXICAM (MOBIC) 15 MG TABLET    Take 1 Tab by mouth daily. ZOLPIDEM CR (AMBIEN CR) 12.5 MG TABLET    Take 1 Tab by mouth nightly as needed. _______________________________

## 2019-04-02 NOTE — ED NOTES
Yanet Thurman is a 52 y.o. female that was discharged in stable condition. The patients diagnosis, condition and treatment were explained to  patient and aftercare instructions were given. The patient verbalized understanding. Patient armband removed and shredded.

## 2019-04-02 NOTE — LETTER
NOTIFICATION OF RETURN TO WORK 
 
4/2/2019 7:07 PM 
 
Ms. Dolly Maharaj [address] [address] [address] [address] [facility] To Whom It May Concern: 
 
Dolly Maharaj was under the care of [address] [facility] EMERGENCY DEPT. She will be able to return to work on Monday, 4/8/19. If there are questions or concerns please have the patient contact our office. Sincerely, Karma Quinonez PA-C

## 2019-04-02 NOTE — ED TRIAGE NOTES
Patient states being advised by Dr. Teodora Rogers to report to ER for CT abdomen. She states abdominal pain x one week. C/o elevated LFTs

## 2019-04-03 LAB
HAV IGM SER QL: POSITIVE
HBV CORE IGM SER QL: NEGATIVE
HBV SURFACE AG SER QL: 0.15 INDEX
HBV SURFACE AG SER QL: NEGATIVE
HCV AB SER IA-ACNC: 0.34 INDEX
HCV AB SERPL QL IA: NEGATIVE
HCV COMMENT,HCGAC: ABNORMAL
SP1: ABNORMAL
SP2: ABNORMAL
SP3: ABNORMAL

## 2019-04-03 NOTE — PROGRESS NOTES
Inform patient of positive result, she feels nauseous, without any vomiting. Currently taking the Zofran. She will call the gastroenterologist today to make an appointment.

## 2019-06-05 ENCOUNTER — HOSPITAL ENCOUNTER (OUTPATIENT)
Dept: MAMMOGRAPHY | Age: 54
Discharge: HOME | End: 2019-06-05
Attending: OBSTETRICS & GYNECOLOGY
Payer: COMMERCIAL

## 2019-06-05 DIAGNOSIS — Z12.39 BREAST SCREENING, UNSPECIFIED: ICD-10-CM

## 2019-06-05 PROCEDURE — 77063 BREAST TOMOSYNTHESIS BI: CPT

## 2020-07-13 ENCOUNTER — HOSPITAL ENCOUNTER (OUTPATIENT)
Dept: MAMMOGRAPHY | Age: 55
Discharge: HOME | End: 2020-07-13
Attending: OBSTETRICS & GYNECOLOGY
Payer: COMMERCIAL

## 2020-07-13 DIAGNOSIS — Z12.31 VISIT FOR SCREENING MAMMOGRAM: ICD-10-CM

## 2020-07-13 PROCEDURE — 77063 BREAST TOMOSYNTHESIS BI: CPT

## 2020-09-16 ENCOUNTER — HOSPITAL ENCOUNTER (OUTPATIENT)
Age: 55
Setting detail: OUTPATIENT SURGERY
Discharge: HOME | End: 2020-09-16
Attending: INTERNAL MEDICINE | Admitting: INTERNAL MEDICINE
Payer: COMMERCIAL

## 2020-09-16 ENCOUNTER — ANESTHESIA EVENT (OUTPATIENT)
Dept: ENDOSCOPY | Age: 55
End: 2020-09-16
Payer: COMMERCIAL

## 2020-09-16 ENCOUNTER — ANESTHESIA (OUTPATIENT)
Dept: ENDOSCOPY | Age: 55
End: 2020-09-16
Payer: COMMERCIAL

## 2020-09-16 VITALS
BODY MASS INDEX: 43.98 KG/M2 | HEART RATE: 81 BPM | OXYGEN SATURATION: 99 % | WEIGHT: 224 LBS | DIASTOLIC BLOOD PRESSURE: 94 MMHG | RESPIRATION RATE: 20 BRPM | TEMPERATURE: 97.1 F | HEIGHT: 60 IN | SYSTOLIC BLOOD PRESSURE: 132 MMHG

## 2020-09-16 PROCEDURE — 76060000031 HC ANESTHESIA FIRST 0.5 HR: Performed by: INTERNAL MEDICINE

## 2020-09-16 PROCEDURE — 74011000250 HC RX REV CODE- 250: Performed by: ANESTHESIOLOGY

## 2020-09-16 PROCEDURE — 87635 SARS-COV-2 COVID-19 AMP PRB: CPT

## 2020-09-16 PROCEDURE — 88305 TISSUE EXAM BY PATHOLOGIST: CPT

## 2020-09-16 PROCEDURE — 76040000019: Performed by: INTERNAL MEDICINE

## 2020-09-16 PROCEDURE — 74011250636 HC RX REV CODE- 250/636: Performed by: ANESTHESIOLOGY

## 2020-09-16 PROCEDURE — 74011250636 HC RX REV CODE- 250/636: Performed by: NURSE ANESTHETIST, CERTIFIED REGISTERED

## 2020-09-16 PROCEDURE — 2709999900 HC NON-CHARGEABLE SUPPLY: Performed by: INTERNAL MEDICINE

## 2020-09-16 PROCEDURE — 74011000250 HC RX REV CODE- 250: Performed by: NURSE ANESTHETIST, CERTIFIED REGISTERED

## 2020-09-16 PROCEDURE — 77030029384 HC SNR POLYP CAPTVR II BSC -B: Performed by: INTERNAL MEDICINE

## 2020-09-16 PROCEDURE — 77030008565 HC TBNG SUC IRR ERBE -B: Performed by: INTERNAL MEDICINE

## 2020-09-16 RX ORDER — SODIUM CHLORIDE, SODIUM LACTATE, POTASSIUM CHLORIDE, CALCIUM CHLORIDE 600; 310; 30; 20 MG/100ML; MG/100ML; MG/100ML; MG/100ML
25 INJECTION, SOLUTION INTRAVENOUS CONTINUOUS
Status: DISCONTINUED | OUTPATIENT
Start: 2020-09-16 | End: 2020-09-16 | Stop reason: HOSPADM

## 2020-09-16 RX ORDER — PROPOFOL 10 MG/ML
INJECTION, EMULSION INTRAVENOUS AS NEEDED
Status: DISCONTINUED | OUTPATIENT
Start: 2020-09-16 | End: 2020-09-16 | Stop reason: HOSPADM

## 2020-09-16 RX ORDER — SODIUM CHLORIDE, SODIUM LACTATE, POTASSIUM CHLORIDE, CALCIUM CHLORIDE 600; 310; 30; 20 MG/100ML; MG/100ML; MG/100ML; MG/100ML
50 INJECTION, SOLUTION INTRAVENOUS CONTINUOUS
Status: DISCONTINUED | OUTPATIENT
Start: 2020-09-16 | End: 2020-09-16 | Stop reason: HOSPADM

## 2020-09-16 RX ORDER — INSULIN LISPRO 100 [IU]/ML
INJECTION, SOLUTION INTRAVENOUS; SUBCUTANEOUS ONCE
Status: DISCONTINUED | OUTPATIENT
Start: 2020-09-16 | End: 2020-09-16 | Stop reason: HOSPADM

## 2020-09-16 RX ORDER — SODIUM CHLORIDE 0.9 % (FLUSH) 0.9 %
5-40 SYRINGE (ML) INJECTION AS NEEDED
Status: DISCONTINUED | OUTPATIENT
Start: 2020-09-16 | End: 2020-09-16 | Stop reason: HOSPADM

## 2020-09-16 RX ORDER — LIDOCAINE HYDROCHLORIDE 20 MG/ML
INJECTION, SOLUTION EPIDURAL; INFILTRATION; INTRACAUDAL; PERINEURAL AS NEEDED
Status: DISCONTINUED | OUTPATIENT
Start: 2020-09-16 | End: 2020-09-16 | Stop reason: HOSPADM

## 2020-09-16 RX ORDER — SODIUM CHLORIDE 0.9 % (FLUSH) 0.9 %
5-40 SYRINGE (ML) INJECTION EVERY 8 HOURS
Status: DISCONTINUED | OUTPATIENT
Start: 2020-09-16 | End: 2020-09-16 | Stop reason: HOSPADM

## 2020-09-16 RX ADMIN — SODIUM CHLORIDE, SODIUM LACTATE, POTASSIUM CHLORIDE, AND CALCIUM CHLORIDE 25 ML/HR: 600; 310; 30; 20 INJECTION, SOLUTION INTRAVENOUS at 14:02

## 2020-09-16 RX ADMIN — PROPOFOL 100 MG: 10 INJECTION, EMULSION INTRAVENOUS at 15:03

## 2020-09-16 RX ADMIN — PROPOFOL 50 MG: 10 INJECTION, EMULSION INTRAVENOUS at 15:08

## 2020-09-16 RX ADMIN — PROPOFOL 50 MG: 10 INJECTION, EMULSION INTRAVENOUS at 15:17

## 2020-09-16 RX ADMIN — FAMOTIDINE 20 MG: 10 INJECTION INTRAVENOUS at 14:22

## 2020-09-16 RX ADMIN — PROPOFOL 50 MG: 10 INJECTION, EMULSION INTRAVENOUS at 15:05

## 2020-09-16 RX ADMIN — PROPOFOL 50 MG: 10 INJECTION, EMULSION INTRAVENOUS at 15:20

## 2020-09-16 RX ADMIN — PROPOFOL 50 MG: 10 INJECTION, EMULSION INTRAVENOUS at 15:14

## 2020-09-16 RX ADMIN — PROPOFOL 50 MG: 10 INJECTION, EMULSION INTRAVENOUS at 15:11

## 2020-09-16 RX ADMIN — LIDOCAINE HYDROCHLORIDE 100 MG: 20 INJECTION, SOLUTION EPIDURAL; INFILTRATION; INTRACAUDAL; PERINEURAL at 15:08

## 2020-09-16 NOTE — PROGRESS NOTES
[email or web address]  [place], [address]      Brief Procedure Note    Reba Porter  1965  [id number]    Date of Procedure: 9/16/2020    Preoperative diagnosis: CRCS    Postoperative diagnosis: hepatic flexure polyp x 2, transverse polyp x 1, hemorrhoids    Type of Anesthesia: MAC (Monitored anesthesia care)    Description of findings: same as post op dx    Procedure: Procedure(s):  COLONOSCOPY    :  Dr. Tae Alejandre MD    Assistant(s): Endoscopy Technician-1: Estrellita Varma  Endoscopy RN-1: Lainey Irvin RN; Bunny Najera    Devices/implants/grafts/tissues/prosthesis: None    EBL:None    Specimens:   ID Type Source Tests Collected by Time Destination   1 : Hepatic flexure & transverse polyps Preservative Colon  León Osman MD 9/16/2020 1519 Pathology       Findings: See printed and scanned procedure note    Complications: None    Dr. Tae Alejandre MD  9/16/2020  3:34 PM

## 2020-09-16 NOTE — DISCHARGE INSTRUCTIONS
DISCHARGE SUMMARY from Nurse  PATIENT INSTRUCTIONS:  After general anesthesia or intravenous sedation, for 24 hours or while taking prescription Narcotics:  · Limit your activities  · Do not drive and operate hazardous machinery  · Do not make important personal or business decisions  · Do  not drink alcoholic beverages  · If you have not urinated within 8 hours after discharge, please contact your surgeon on call. Report the following to your surgeon:  · Excessive pain, swelling, redness or odor of or around the surgical area  · Temperature over 100.5  · Nausea and vomiting lasting longer than 4 hours or if unable to take medications  · Any signs of decreased circulation or nerve impairment to extremity: change in color, persistent  numbness, tingling, coldness or increase pain  · Any questions    What to do at Home:  Recommended activity: Activity as tolerated and no driving for today. *  Please give a list of your current medications to your Primary Care Provider. *  Please update this list whenever your medications are discontinued, doses are      changed, or new medications (including over-the-counter products) are added. *  Please carry medication information at all times in case of emergency situations. These are general instructions for a healthy lifestyle:    No smoking/ No tobacco products/ Avoid exposure to second hand smoke  Surgeon General's Warning:  Quitting smoking now greatly reduces serious risk to your health. Obesity, smoking, and sedentary lifestyle greatly increases your risk for illness    A healthy diet, regular physical exercise & weight monitoring are important for maintaining a healthy lifestyle    You may be retaining fluid if you have a history of heart failure or if you experience any of the following symptoms:  Weight gain of 3 pounds or more overnight or 5 pounds in a week, increased swelling in our hands or feet or shortness of breath while lying flat in bed.   Please call your doctor as soon as you notice any of these symptoms; do not wait until your next office visit. The discharge information has been reviewed with the patient. The patient verbalized understanding. Discharge medications reviewed with the patient and appropriate educational materials and side effects teaching were provided. ___________________________________________________________________________________________________________________________________    Patient Education   Colonoscopy: What to Expect at Home  Your Recovery  After a colonoscopy, you'll stay at the clinic for 1 to 2 hours until the medicines wear off. Then you can go home. But you'll need to arrange for a ride. Your doctor will tell you when you can eat and do your other usual activities. Your doctor will talk to you about when you'll need your next colonoscopy. Your doctor can help you decide how often you need to be checked. This will depend on the results of your test and your risk for colorectal cancer. After the test, you may be bloated or have gas pains. You may need to pass gas. If a biopsy was done or a polyp was removed, you may have streaks of blood in your stool (feces) for a few days. Problems such as heavy rectal bleeding may not occur until several weeks after the test. This isn't common. But it can happen after polyps are removed. This care sheet gives you a general idea about how long it will take for you to recover. But each person recovers at a different pace. Follow the steps below to get better as quickly as possible. How can you care for yourself at home? Activity    · Rest when you feel tired.     · You can do your normal activities when it feels okay to do so. Diet    · Follow your doctor's directions for eating.     · Unless your doctor has told you not to, drink plenty of fluids. This helps to replace the fluids that were lost during the colon prep.     · Do not drink alcohol.    Medicines    · Your doctor will tell you if and when you can restart your medicines. He or she will also give you instructions about taking any new medicines.     · If you take aspirin or some other blood thinner, ask your doctor if and when to start taking it again. Make sure that you understand exactly what your doctor wants you to do.     · If polyps were removed or a biopsy was done during the test, your doctor may tell you not to take aspirin or other anti-inflammatory medicines for a few days. These include ibuprofen (Advil, Motrin) and naproxen (Aleve). Other instructions    · For your safety, do not drive or operate machinery until the medicine wears off and you can think clearly. Your doctor may tell you not to drive or operate machinery until the day after your test.     · Do not sign legal documents or make major decisions until the medicine wears off and you can think clearly. The anesthesia can make it hard for you to fully understand what you are agreeing to. Follow-up care is a key part of your treatment and safety. Be sure to make and go to all appointments, and call your doctor if you are having problems. It's also a good idea to know your test results and keep a list of the medicines you take. When should you call for help? Call 911 anytime you think you may need emergency care. For example, call if:    · You passed out (lost consciousness).     · You pass maroon or bloody stools.     · You have trouble breathing. Call your doctor now or seek immediate medical care if:    · You have pain that does not get better after you take pain medicine.     · You are sick to your stomach or cannot drink fluids.     · You have new or worse belly pain.     · You have blood in your stools.     · You have a fever.     · You cannot pass stools or gas. Watch closely for changes in your health, and be sure to contact your doctor if you have any problems. Where can you learn more?   Go to http://www.gray.com/  Enter E264 in the search box to learn more about \"Colonoscopy: What to Expect at Home. \"  Current as of: April 29, 2020               Content Version: 12.6  © 2006-2020 [employer]. Care instructions adapted under license by [employer] (which disclaims liability or warranty for this information). If you have questions about a medical condition or this instruction, always ask your healthcare professional. [place] [address] any warranty or liability for your use of this information. Patient Education   Colon Polyps: Care Instructions  Your Care Instructions     Colon polyps are growths in the colon or the rectum. The cause of most colon polyps is not known, and most people who get them do not have any problems. But a certain kind can turn into cancer. For this reason, regular testing for colon polyps is important for people as they get older. It is also important for anyone who has an increased risk for colon cancer. Polyps are usually found through routine colon cancer screening tests. Although most colon polyps are not cancerous, they are usually removed and then tested for cancer. Screening for colon cancer saves lives because the cancer can usually be cured if it is caught early. If you have a polyp that is the type that can turn into cancer, you may need more tests to examine your entire colon. The doctor will remove any other polyps that he or she finds, and you will be tested more often. Follow-up care is a key part of your treatment and safety. Be sure to make and go to all appointments, and call your doctor if you are having problems. It's also a good idea to know your test results and keep a list of the medicines you take. How can you care for yourself at home? Regular exams to look for colon polyps are the best way to prevent polyps from turning into colon cancer.  These can include stool tests, sigmoidoscopy, colonoscopy, and CT colonography. Talk with your doctor about a testing schedule that is right for you. To prevent polyps  There is no home treatment that can prevent colon polyps. But these steps may help lower your risk for cancer. · Stay active. Being active can help you get to and stay at a healthy weight. Try to exercise on most days of the week. Walking is a good choice. · Eat well. Choose a variety of vegetables, fruits, legumes (such as peas and beans), fish, poultry, and whole grains. · Do not smoke. If you need help quitting, talk to your doctor about stop-smoking programs and medicines. These can increase your chances of quitting for good. · If you drink alcohol, limit how much you drink. Limit alcohol to 2 drinks a day for men and 1 drink a day for women. When should you call for help? Call your doctor now or seek immediate medical care if:    · You have severe belly pain.     · Your stools are maroon or very bloody. Watch closely for changes in your health, and be sure to contact your doctor if:    · You have a fever.     · You have nausea or vomiting.     · You have a change in bowel habits (new constipation or diarrhea).     · Your symptoms get worse or are not improving as expected. Where can you learn more? Go to http://kenyatta-alisa.info/  Enter C571 in the search box to learn more about \"Colon Polyps: Care Instructions. \"  Current as of: April 29, 2020               Content Version: 12.6  © 9377-3882 [employer]. Care instructions adapted under license by [employer] (which disclaims liability or warranty for this information). If you have questions about a medical condition or this instruction, always ask your healthcare professional. [place] [address] any warranty or liability for your use of this information.        Patient Education   Hemorrhoids: Care Instructions  Your Care Instructions     Hemorrhoids are enlarged veins that develop in the anal canal. Bleeding during bowel movements, itching, swelling, and rectal pain are the most common symptoms. They can be uncomfortable at times, but hemorrhoids rarely are a serious problem. You can treat most hemorrhoids with simple changes to your diet and bowel habits. These changes include eating more fiber and not straining to pass stools. Most hemorrhoids do not need surgery or other treatment unless they are very large and painful or bleed a lot. Follow-up care is a key part of your treatment and safety. Be sure to make and go to all appointments, and call your doctor if you are having problems. It's also a good idea to know your test results and keep a list of the medicines you take. How can you care for yourself at home? · Sit in a few inches of warm water (sitz bath) 3 times a day and after bowel movements. The warm water helps with pain and itching. · Put ice on your anal area several times a day for 10 minutes at a time. Put a thin cloth between the ice and your skin. Follow this by placing a warm, wet towel on the area for another 10 to 20 minutes. · Take pain medicines exactly as directed. ? If the doctor gave you a prescription medicine for pain, take it as prescribed. ? If you are not taking a prescription pain medicine, ask your doctor if you can take an over-the-counter medicine. · Keep the anal area clean, but be gentle. Use water and a fragrance-free soap, such as [place], or use baby wipes or medicated pads, such as Tucks. · Wear cotton underwear and loose clothing to decrease moisture in the anal area. · Eat more fiber. Include foods such as whole-grain breads and cereals, raw vegetables, raw and dried fruits, and beans. · Drink plenty of fluids, enough so that your urine is light yellow or clear like water.  If you have kidney, heart, or liver disease and have to limit fluids, talk with your doctor before you increase the amount of fluids you drink.  · Use a stool softener that contains bran or psyllium. You can save money by buying bran or psyllium (available in bulk at most health food stores) and sprinkling it on foods or stirring it into fruit juice. Or you can use a product such as Metamucil or Hydrocil. · Practice healthy bowel habits. ? Go to the bathroom as soon as you have the urge. ? Avoid straining to pass stools. Relax and give yourself time to let things happen naturally. ? Do not hold your breath while passing stools. ? Do not read while sitting on the toilet. Get off the toilet as soon as you have finished. · Take your medicines exactly as prescribed. Call your doctor if you think you are having a problem with your medicine. When should you call for help? Call 911 anytime you think you may need emergency care. For example, call if:    · You pass maroon or very bloody stools. Call your doctor now or seek immediate medical care if:    · You have increased pain.     · You have increased bleeding. Watch closely for changes in your health, and be sure to contact your doctor if:    · Your symptoms have not improved after 3 or 4 days. Where can you learn more? Go to http://kenyatta-alisa.info/  Enter F228 in the search box to learn more about \"Hemorrhoids: Care Instructions. \"  Current as of: April 15, 2020               Content Version: 12.6  © 9656-0333 [employer]. Care instructions adapted under license by [employer] (which disclaims liability or warranty for this information). If you have questions about a medical condition or this instruction, always ask your healthcare professional. [place] [address] any warranty or liability for your use of this information.

## 2020-09-16 NOTE — ANESTHESIA POSTPROCEDURE EVALUATION
Procedure(s):  COLONOSCOPY. MAC    Anesthesia Post Evaluation      Multimodal analgesia: multimodal analgesia used between 6 hours prior to anesthesia start to PACU discharge  Patient location during evaluation: bedside  Patient participation: complete - patient participated  Level of consciousness: awake  Pain score: 0  Pain management: adequate  Airway patency: patent  Anesthetic complications: no  Cardiovascular status: stable  Respiratory status: acceptable  Hydration status: acceptable  Post anesthesia nausea and vomiting:  none  Final Post Anesthesia Temperature Assessment:  Normothermia (36.0-37.5 degrees C)      INITIAL Post-op Vital signs:   Vitals Value Taken Time   /81 9/16/2020  3:41 PM   Temp 36.3 °C (97.3 °F) 9/16/2020  3:33 PM   Pulse 80 9/16/2020  3:50 PM   Resp 21 9/16/2020  3:50 PM   SpO2 100 % 9/16/2020  3:50 PM   Vitals shown include unvalidated device data.

## 2020-09-16 NOTE — ANESTHESIA PREPROCEDURE EVALUATION
Relevant Problems   No relevant active problems       Anesthetic History   No history of anesthetic complications            Review of Systems / Medical History  Patient summary reviewed and pertinent labs reviewed    Pulmonary          Undiagnosed apnea  Asthma : well controlled       Neuro/Psych   Within defined limits           Cardiovascular    Hypertension              Exercise tolerance: >4 METS     GI/Hepatic/Renal     GERD           Endo/Other        Morbid obesity     Other Findings              Physical Exam    Airway  Mallampati: II  TM Distance: 4 - 6 cm  Neck ROM: normal range of motion   Mouth opening: Normal     Cardiovascular  Regular rate and rhythm,  S1 and S2 normal,  no murmur, click, rub, or gallop  Rhythm: regular  Rate: normal         Dental    Dentition: Lower dentition intact and Upper dentition intact     Pulmonary  Breath sounds clear to auscultation               Abdominal  GI exam deferred       Other Findings            Anesthetic Plan    ASA: 3  Anesthesia type: general          Induction: Intravenous  Anesthetic plan and risks discussed with: Patient

## 2020-09-16 NOTE — H&P
[email or web address]  [phone]    Gastroenterology pre op History and Physical     Impression:   1. High risk colon cancer screening exam      Plan:     1. Colonoscopy    Addendum: All lab tests orders pertaining to the procedure have been ordered by the anesthesia personnel and results will be addressed by the anesthesia team      Chief Complaint: high risk colon cancer screening exam.      HPI:  Michelle Ascencio is a 60 y.o. female who is being seen on consult for high risk colon cancer screening with colonoscopy.  There is a previous history of colon polyps, and the patient returns for a surveillence exam; She also has a Family history of colon cancer    PMH:   Past Medical History:   Diagnosis Date    Allergic rhinitis 4/30/2010    Anemia 4/30/2010    Anemia NEC     Asthma 4/30/2010    Chest pain, unspecified     GERD, CHEST WALL PAINS    Edema     R/O CHF, PULMONARY HTN     Family history of colon cancer     Mother     GERD (gastroesophageal reflux disease) 4/30/2010    History of breast lump/mass excision 4/30/2010    HTN (hypertension) 4/30/2010    Insomnia 4/30/2010    Migraine 4/30/2010    Mitral valve insufficiency and aortic valve insufficiency     TRACE TO MILD AI, MILD MR    Obesity     S/P colonoscopy 5/28/2010    S/P tonsillectomy 5/28/2010    Vitamin D deficiency 4/30/2010       PSH:   Past Surgical History:   Procedure Laterality Date    HX BREAST BIOPSY  5-18-11    Left    HX BREAST REDUCTION      HX TONSIL AND ADENOIDECTOMY         Social HX:   Social History     Socioeconomic History    Marital status:      Spouse name: Not on file    Number of children: Not on file    Years of education: Not on file    Highest education level: Not on file   Occupational History    Not on file   Social Needs    Financial resource strain: Not on file    Food insecurity     Worry: Not on file     Inability: Not on file    Transportation needs     Medical: Not on file Non-medical: Not on file   Tobacco Use    Smoking status: Never Smoker    Smokeless tobacco: Never Used   Substance and Sexual Activity    Alcohol use: Yes     Comment: light    Drug use: Yes     Types: Prescription, OTC    Sexual activity: Not on file   Lifestyle    Physical activity     Days per week: Not on file     Minutes per session: Not on file    Stress: Not on file   Relationships    Social connections     Talks on phone: Not on file     Gets together: Not on file     Attends Hoahaoism service: Not on file     Active member of club or organization: Not on file     Attends meetings of clubs or organizations: Not on file     Relationship status: Not on file    Intimate partner violence     Fear of current or ex partner: Not on file     Emotionally abused: Not on file     Physically abused: Not on file     Forced sexual activity: Not on file   Other Topics Concern    Not on file   Social History Narrative    Not on file       FHX:   Family History   Problem Relation Age of Onset    Colon Cancer Mother     Hypertension Mother 52    Cancer Mother 62        Colon    Cancer Father         liver    Hypertension Father     Cancer Maternal Grandmother         stomach    Other Brother         sacoidosis    Hypertension Brother     Heart Disease Brother 43    Diabetes Brother 48    Heart Disease Sister         CAD/ICH    Breast Cancer Sister     Ovarian Cancer Sister     Breast Cancer Maternal Aunt        Allergy:   Allergies   Allergen Reactions    Lisinopril Cough    Other Medication Other (comments)     Anesthesia-Had a hard time coming out of Anesthesia       Home Medications:     Medications Prior to Admission   Medication Sig    ondansetron (ZOFRAN ODT) 4 mg disintegrating tablet Take 1-2 tablets every 6-8 hours as needed for nausea and vomiting.  ondansetron hcl (ZOFRAN, AS HYDROCHLORIDE,) 8 mg tablet Take 1 Tab by mouth every eight (8) hours as needed for Nausea.     losartan (COZAAR) 100 mg tablet Take 1 Tab by mouth daily.  NIFEdipine ER (NIFEDICAL XL) 60 mg ER tablet Take 1 Tab by mouth daily.  topiramate (TOPAMAX) 25 mg tablet Take 1 Tab by mouth two (2) times a day.  fluticasone (VERAMYST) 27.5 mcg/actuation nasal spray 2 Sprays by Both Nostrils route daily.  cholecalciferol, vitamin D3, (VITAMIN D3) 2,000 unit tab Take 2,000 Units by mouth daily.  cetirizine (ZYRTEC) 10 mg tablet Take 1 Tab by mouth daily.  oxymetazoline (AFRIN) 0.05 % nasal spray 2 Sprays two (2) times a day.  aspirin 81 mg chewable tablet Take 81 mg by mouth daily.  albuterol (PROVENTIL, VENTOLIN) 90 mcg/actuation inhaler Take 2 Puffs by inhalation every six (6) hours as needed for Wheezing. Review of Systems:     Constitutional: No fevers, chills, weight loss, fatigue. Skin: No rashes, pruritis, jaundice, ulcerations, erythema. HENT: No headaches, nosebleeds, sinus pressure, rhinorrhea, sore throat. Eyes: No visual changes, blurred vision, eye pain, photophobia, jaundice. Cardiovascular: No chest pain, heart palpitations. Respiratory: No cough, SOB, wheezing, chest discomfort, orthopnea. Gastrointestinal:    Genitourinary: No dysuria, bleeding, discharge, pyuria. Musculoskeletal: No weakness, arthralgias, wasting. Endo: No sweats. Heme: No bruising, easy bleeding. Allergies: As noted. Neurological: Cranial nerves intact. Alert and oriented. Gait not assessed. Psychiatric:  No anxiety, depression, hallucinations. There were no vitals taken for this visit. Physical Assessment:     constitutional: appearance: well developed, well nourished, normal habitus, no deformities, in no acute distress. skin: inspection: no rashes, ulcers, icterus or other lesions; no clubbing or telangiectasias. palpation: no induration or subcutaneos nodules.    eyes: inspection: normal conjunctivae and lids; no jaundice pupils: symmetrical, normoreactive to light, normal accommodation and size. ENMT: mouth: normal oral mucosa,lips and gums; good dentition. oropharynx: normal tongue, hard and soft palate; posterior pharynx without erithema, exudate or lesions. neck: thyroid: normal size, consistency and position; no masses or tenderness. respiratory: effort: normal chest excursion; no intercostal retraction or accessory muscle use. cardiovascular: abdominal aorta: normal size and position; no bruits. palpation: PMI of normal size and position; normal rhythm; no thrill or murmurs. abdominal: abdomen: normal consistency; no tenderness or masses. hernias: no hernias appreciated. liver: normal size and consistency. spleen: not palpable. rectal: hemoccult/guaiac: not performed. musculoskeletal: digits and nails: no clubbing, cyanosis, petechiae or other inflammatory conditions. gait: normal gait and station head and neck: normal range of motion; no pain, crepitation or contracture. spine/ribs/pelvis: normal range of motion; no pain, deformity or contracture. lymphatic: axilae: not palpable. groin: not palpable. neck: within normal limits. other: not palpable. neurologic: cranial nerves: II-XII normal.   psychiatric: judgement/insight: within normal limits. memory: within normal limits for recent and remote events. mood and affect: no evidence of depression, anxiety or agitation. orientation: oriented to time, space and person. Basic Metabolic Profile   No results for input(s): NA, K, CL, CO2, BUN, GLU, CA, MG, PHOS in the last 72 hours. No lab exists for component: CREAT      CBC w/Diff    No results for input(s): WBC, RBC, HGB, HCT, MCV, MCH, MCHC, RDW, PLT, HGBEXT, HCTEXT, PLTEXT in the last 72 hours. No lab exists for component: MPV No results for input(s): GRANS, LYMPH, EOS, PRO, MYELO, METAS, BLAST in the last 72 hours.     No lab exists for component: MONO, BASO     Hepatic Function   No results for input(s): ALB, TP, TBILI, AP, AML, LPSE in the last 72 hours. No lab exists for component: ALEXUS JUAN, SGOT       Kaleb Campbell MD  [facility], [facility]  [email or web address]

## 2020-09-17 LAB
COVID-19 RAPID TEST, COVR: NOT DETECTED
SOURCE, COVRS: NORMAL
SPECIMEN TYPE, XMCV1T: NORMAL

## 2021-04-02 ENCOUNTER — TRANSCRIBE ORDER (OUTPATIENT)
Dept: SCHEDULING | Age: 56
End: 2021-04-02

## 2021-04-02 DIAGNOSIS — R10.11 RIGHT UPPER QUADRANT PAIN: Primary | ICD-10-CM

## 2021-04-07 ENCOUNTER — HOSPITAL ENCOUNTER (OUTPATIENT)
Dept: ULTRASOUND IMAGING | Age: 56
Discharge: HOME | End: 2021-04-07
Attending: FAMILY MEDICINE
Payer: COMMERCIAL

## 2021-04-07 DIAGNOSIS — R10.11 RIGHT UPPER QUADRANT PAIN: ICD-10-CM

## 2021-04-07 PROCEDURE — 76705 ECHO EXAM OF ABDOMEN: CPT

## 2021-04-30 ENCOUNTER — TRANSCRIBE ORDER (OUTPATIENT)
Dept: SCHEDULING | Age: 56
End: 2021-04-30

## 2021-04-30 DIAGNOSIS — I67.1 CEREBRAL ANEURYSM, NONRUPTURED: Primary | ICD-10-CM

## 2021-05-05 ENCOUNTER — TRANSCRIBE ORDER (OUTPATIENT)
Dept: SCHEDULING | Age: 56
End: 2021-05-05

## 2021-05-05 DIAGNOSIS — K76.0 STEATOSIS OF LIVER: ICD-10-CM

## 2021-05-22 ENCOUNTER — HOSPITAL ENCOUNTER (OUTPATIENT)
Dept: CT IMAGING | Age: 56
Discharge: HOME | End: 2021-05-22
Attending: NURSE PRACTITIONER
Payer: COMMERCIAL

## 2021-05-22 ENCOUNTER — HOSPITAL ENCOUNTER (OUTPATIENT)
Age: 56
Discharge: HOME | End: 2021-05-22
Attending: PHYSICIAN ASSISTANT
Payer: COMMERCIAL

## 2021-05-22 DIAGNOSIS — I67.1 CEREBRAL ANEURYSM, NONRUPTURED: ICD-10-CM

## 2021-05-22 DIAGNOSIS — K76.0 STEATOSIS OF LIVER: ICD-10-CM

## 2021-05-22 PROCEDURE — 82565 ASSAY OF CREATININE: CPT

## 2021-05-22 PROCEDURE — 74011000636 HC RX REV CODE- 636

## 2021-05-22 PROCEDURE — 77030021566 MRI ABD W MRCP W WO CONT

## 2021-05-22 PROCEDURE — 74011250636 HC RX REV CODE- 250/636: Performed by: PHYSICIAN ASSISTANT

## 2021-05-22 PROCEDURE — A9577 INJ MULTIHANCE: HCPCS | Performed by: PHYSICIAN ASSISTANT

## 2021-05-22 PROCEDURE — 70498 CT ANGIOGRAPHY NECK: CPT

## 2021-05-22 RX ADMIN — GADOBENATE DIMEGLUMINE 20 ML: 529 INJECTION, SOLUTION INTRAVENOUS at 11:00

## 2021-05-22 RX ADMIN — IOPAMIDOL 100 ML: 755 INJECTION, SOLUTION INTRAVENOUS at 09:10

## 2021-05-23 LAB — CREAT UR-MCNC: 0.6 MG/DL (ref 0.6–1.3)

## 2021-06-09 ENCOUNTER — TRANSCRIBE ORDER (OUTPATIENT)
Dept: SCHEDULING | Age: 56
End: 2021-06-09

## 2021-06-09 DIAGNOSIS — Z12.31 VISIT FOR SCREENING MAMMOGRAM: Primary | ICD-10-CM

## 2021-07-15 ENCOUNTER — HOSPITAL ENCOUNTER (OUTPATIENT)
Dept: MAMMOGRAPHY | Age: 56
Discharge: HOME | End: 2021-07-15
Attending: FAMILY MEDICINE
Payer: COMMERCIAL

## 2021-07-15 DIAGNOSIS — Z12.31 VISIT FOR SCREENING MAMMOGRAM: ICD-10-CM

## 2021-07-15 PROCEDURE — 77063 BREAST TOMOSYNTHESIS BI: CPT

## 2021-08-25 ENCOUNTER — TRANSCRIBE ORDER (OUTPATIENT)
Dept: SCHEDULING | Age: 56
End: 2021-08-25

## 2021-08-25 DIAGNOSIS — E87.6 HYPOKALEMIA: Primary | ICD-10-CM

## 2021-11-01 ENCOUNTER — HOSPITAL ENCOUNTER (OUTPATIENT)
Dept: VASCULAR SURGERY | Age: 56
Discharge: HOME | End: 2021-11-01
Attending: INTERNAL MEDICINE
Payer: COMMERCIAL

## 2021-11-01 DIAGNOSIS — E87.6 HYPOKALEMIA: ICD-10-CM

## 2021-11-01 PROCEDURE — 93975 VASCULAR STUDY: CPT

## 2021-11-02 LAB
ABDOMINAL PROX AORTA VEL: 102.1 CM/S
LEFT INTERLOBAR EDV: 21.5 CM/S
LEFT INTERLOBAR PSV: 45.7 CM/S
LEFT INTERLOBAR RI: 0.5
LEFT KIDNEY LENGTH: 10.88 CM
LEFT KIDNEY WIDTH: 5.11 CM
LEFT RENAL DIST DIAS: 26.7 CM/S
LEFT RENAL DIST RAR: 0.87
LEFT RENAL DIST RI: 0.7
LEFT RENAL DIST SYS: 88.8 CM/S
LEFT RENAL LOWER PARENCHYMA MAX: 15.2 CM/S
LEFT RENAL LOWER PARENCHYMA MIN: 6.4 CM/S
LEFT RENAL LOWER PARENCHYMA RI: 0.58
LEFT RENAL MID DIAS: 16 CM/S
LEFT RENAL MID RAR: 0.56
LEFT RENAL MID RI: 0.72
LEFT RENAL MID SYS: 57 CM/S
LEFT RENAL MIDDLE PARENCHYMA MAX: 14.6 CM/S
LEFT RENAL MIDDLE PARENCHYMA MIN: 6.9 CM/S
LEFT RENAL MIDDLE PARENCHYMA RI: 0.53
LEFT RENAL ORIGIN DIAS: 25.1 CM/S
LEFT RENAL ORIGIN RAR: 0.66
LEFT RENAL ORIGIN RI: 0.63
LEFT RENAL ORIGIN SYS: 67.7 CM/S
LEFT RENAL PROX DIAS: 24.1 CM/S
LEFT RENAL PROX RAR: 0.71
LEFT RENAL PROX RI: 0.67
LEFT RENAL PROX SYS: 72 CM/S
LEFT RENAL UPPER PARENCHYMA MAX: 17.9 CM/S
LEFT RENAL UPPER PARENCHYMA MIN: 5.8 CM/S
LEFT RENAL UPPER PARENCHYMA RI: 0.68
PROX AORTIC AP: 2 CM
PROX AORTIC TRANS: 2.1 CM
RIGHT INTERLOBAR EDV: 20.8 CM/S
RIGHT INTERLOBAR PSV: 58.2 CM/S
RIGHT INTERLOBAR RI: 0.6
RIGHT KIDNEY LENGTH: 10.03 CM
RIGHT KIDNEY WIDTH: 5.17 CM
RIGHT RENAL DIST DIAS: 30.6 CM/S
RIGHT RENAL DIST RAR: 0.81
RIGHT RENAL DIST RI: 0.63
RIGHT RENAL DIST SYS: 82.2 CM/S
RIGHT RENAL LOWER PARENCHYMA MAX: 17.5 CM/S
RIGHT RENAL LOWER PARENCHYMA MIN: 8.2 CM/S
RIGHT RENAL LOWER PARENCHYMA RI: 0.53
RIGHT RENAL MID DIAS: 29.4 CM/S
RIGHT RENAL MID RAR: 0.86
RIGHT RENAL MID RI: 0.67
RIGHT RENAL MID SYS: 87.8 CM/S
RIGHT RENAL MIDDLE PARENCHYMA MAX: 17.5 CM/S
RIGHT RENAL MIDDLE PARENCHYMA MIN: 8.7 CM/S
RIGHT RENAL MIDDLE PARENCHYMA RI: 0.5
RIGHT RENAL PROX DIAS: 23.5 CM/S
RIGHT RENAL PROX RAR: 0.82
RIGHT RENAL PROX RI: 0.72
RIGHT RENAL PROX SYS: 84.2 CM/S
RIGHT RENAL UPPER PARENCHYMA MAX: 16.4 CM/S
RIGHT RENAL UPPER PARENCHYMA MIN: 7.6 CM/S
RIGHT RENAL UPPER PARENCHYMA RI: 0.54

## 2021-11-05 ENCOUNTER — HOSPITAL ENCOUNTER (OUTPATIENT)
Dept: LAB | Age: 56
Discharge: HOME | End: 2021-11-05
Payer: COMMERCIAL

## 2021-11-05 LAB
ALBUMIN SERPL-MCNC: 3.6 G/DL (ref 3.4–5)
ANION GAP SERPL CALC-SCNC: 8 MMOL/L (ref 3–18)
APPEARANCE UR: CLEAR
BACTERIA URNS QL MICRO: ABNORMAL /HPF
BILIRUB UR QL: NEGATIVE
BUN SERPL-MCNC: 16 MG/DL (ref 7–18)
BUN/CREAT SERPL: 25 (ref 12–20)
CALCIUM SERPL-MCNC: 9.5 MG/DL (ref 8.5–10.1)
CHLORIDE SERPL-SCNC: 103 MMOL/L (ref 100–111)
CO2 SERPL-SCNC: 28 MMOL/L (ref 21–32)
COLOR UR: YELLOW
CREAT SERPL-MCNC: 0.64 MG/DL (ref 0.6–1.3)
CREAT UR-MCNC: 96 MG/DL (ref 30–125)
EPITH CASTS URNS QL MICRO: ABNORMAL /LPF (ref 0–5)
ERYTHROCYTE [DISTWIDTH] IN BLOOD BY AUTOMATED COUNT: 14.4 % (ref 11.6–14.5)
GLUCOSE SERPL-MCNC: 72 MG/DL (ref 74–99)
GLUCOSE UR STRIP.AUTO-MCNC: NEGATIVE MG/DL
HCT VFR BLD AUTO: 36.7 % (ref 35–45)
HGB BLD-MCNC: 11.4 G/DL (ref 12–16)
HGB UR QL STRIP: NEGATIVE
KETONES UR QL STRIP.AUTO: NEGATIVE MG/DL
LEUKOCYTE ESTERASE UR QL STRIP.AUTO: NEGATIVE
MCH RBC QN AUTO: 26 PG (ref 24–34)
MCHC RBC AUTO-ENTMCNC: 31.1 G/DL (ref 31–37)
MCV RBC AUTO: 83.8 FL (ref 78–100)
NITRITE UR QL STRIP.AUTO: NEGATIVE
PH UR STRIP: 8 [PH] (ref 5–8)
PHOSPHATE SERPL-MCNC: 3.6 MG/DL (ref 2.5–4.9)
PLATELET # BLD AUTO: 369 K/UL (ref 135–420)
PMV BLD AUTO: 9.5 FL (ref 9.2–11.8)
POTASSIUM SERPL-SCNC: 3.8 MMOL/L (ref 3.5–5.5)
PROT UR STRIP-MCNC: NEGATIVE MG/DL
PROT UR-MCNC: 18 MG/DL
PROT/CREAT UR-RTO: 0.2
RBC # BLD AUTO: 4.38 M/UL (ref 4.2–5.3)
RBC #/AREA URNS HPF: ABNORMAL /HPF (ref 0–5)
SODIUM SERPL-SCNC: 139 MMOL/L (ref 136–145)
SP GR UR REFRACTOMETRY: 1.02 (ref 1–1.03)
T4 FREE SERPL-MCNC: 0.8 NG/DL (ref 0.7–1.5)
TSH SERPL DL<=0.05 MIU/L-ACNC: 1.86 UIU/ML (ref 0.36–3.74)
UROBILINOGEN UR QL STRIP.AUTO: 0.2 EU/DL (ref 0.2–1)
WBC # BLD AUTO: 7.8 K/UL (ref 4.6–13.2)
WBC URNS QL MICRO: ABNORMAL /HPF (ref 0–5)

## 2021-11-05 PROCEDURE — 82088 ASSAY OF ALDOSTERONE: CPT

## 2021-11-05 PROCEDURE — 84439 ASSAY OF FREE THYROXINE: CPT

## 2021-11-05 PROCEDURE — 83835 ASSAY OF METANEPHRINES: CPT

## 2021-11-05 PROCEDURE — 85027 COMPLETE CBC AUTOMATED: CPT

## 2021-11-05 PROCEDURE — 81001 URINALYSIS AUTO W/SCOPE: CPT

## 2021-11-05 PROCEDURE — 80069 RENAL FUNCTION PANEL: CPT

## 2021-11-05 PROCEDURE — 84156 ASSAY OF PROTEIN URINE: CPT

## 2021-11-05 PROCEDURE — 82533 TOTAL CORTISOL: CPT

## 2021-11-05 PROCEDURE — 84244 ASSAY OF RENIN: CPT

## 2021-11-05 PROCEDURE — 36415 COLL VENOUS BLD VENIPUNCTURE: CPT

## 2021-11-06 LAB — CORTIS SERPL-MCNC: 5.1 UG/DL

## 2021-11-09 LAB — ALDOST SERPL-MCNC: NORMAL NG/DL

## 2021-11-11 LAB
METANEPH FREE SERPL-MCNC: <10 PG/ML (ref 0–88)
NORMETANEPHRINE SERPL-MCNC: 42.5 PG/ML (ref 0–136.8)
RENIN PLAS-CCNC: 0.31 NG/ML/HR (ref 0.17–5.38)

## 2022-04-18 ENCOUNTER — TRANSCRIBE ORDER (OUTPATIENT)
Dept: SCHEDULING | Age: 57
End: 2022-04-18

## 2022-04-18 DIAGNOSIS — Z12.31 VISIT FOR SCREENING MAMMOGRAM: Primary | ICD-10-CM

## 2022-05-27 ENCOUNTER — HOSPITAL ENCOUNTER (OUTPATIENT)
Dept: LAB | Age: 57
Discharge: HOME | End: 2022-05-27
Payer: COMMERCIAL

## 2022-05-27 LAB
ALBUMIN SERPL-MCNC: 4 G/DL (ref 3.4–5)
ANION GAP SERPL CALC-SCNC: 3 MMOL/L (ref 3–18)
BUN SERPL-MCNC: 15 MG/DL (ref 7–18)
BUN/CREAT SERPL: 22 (ref 12–20)
CALCIUM SERPL-MCNC: 9.6 MG/DL (ref 8.5–10.1)
CHLORIDE SERPL-SCNC: 106 MMOL/L (ref 100–111)
CO2 SERPL-SCNC: 32 MMOL/L (ref 21–32)
CREAT SERPL-MCNC: 0.68 MG/DL (ref 0.6–1.3)
GLUCOSE SERPL-MCNC: 88 MG/DL (ref 74–99)
PHOSPHATE SERPL-MCNC: 4 MG/DL (ref 2.5–4.9)
POTASSIUM SERPL-SCNC: 3.5 MMOL/L (ref 3.5–5.5)
SODIUM SERPL-SCNC: 141 MMOL/L (ref 136–145)

## 2022-05-27 PROCEDURE — 36415 COLL VENOUS BLD VENIPUNCTURE: CPT

## 2022-05-27 PROCEDURE — 80069 RENAL FUNCTION PANEL: CPT

## 2022-07-18 ENCOUNTER — HOSPITAL ENCOUNTER (OUTPATIENT)
Dept: MAMMOGRAPHY | Age: 57
Discharge: HOME | End: 2022-07-18
Attending: OBSTETRICS & GYNECOLOGY
Payer: COMMERCIAL

## 2022-07-18 DIAGNOSIS — Z12.31 VISIT FOR SCREENING MAMMOGRAM: ICD-10-CM

## 2022-07-18 PROCEDURE — 77063 BREAST TOMOSYNTHESIS BI: CPT

## 2023-01-30 DIAGNOSIS — Z12.31 VISIT FOR SCREENING MAMMOGRAM: Primary | ICD-10-CM

## 2023-02-03 DIAGNOSIS — Z12.31 VISIT FOR SCREENING MAMMOGRAM: Primary | ICD-10-CM

## 2023-02-11 ENCOUNTER — HOSPITAL ENCOUNTER (OUTPATIENT)
Facility: HOSPITAL | Age: 58
Discharge: HOME | End: 2023-02-14
Payer: COMMERCIAL

## 2023-02-11 LAB
ALBUMIN SERPL-MCNC: 3.5 G/DL (ref 3.4–5)
ANION GAP SERPL CALC-SCNC: 4 MMOL/L (ref 3–18)
BUN SERPL-MCNC: 15 MG/DL (ref 7–18)
BUN/CREAT SERPL: 21 (ref 12–20)
CALCIUM SERPL-MCNC: 8.5 MG/DL (ref 8.5–10.1)
CHLORIDE SERPL-SCNC: 107 MMOL/L (ref 100–111)
CO2 SERPL-SCNC: 29 MMOL/L (ref 21–32)
CREAT SERPL-MCNC: 0.72 MG/DL (ref 0.6–1.3)
CREAT UR-MCNC: 236 MG/DL (ref 30–125)
GLUCOSE SERPL-MCNC: 94 MG/DL (ref 74–99)
MAGNESIUM SERPL-MCNC: 2.3 MG/DL (ref 1.6–2.6)
PHOSPHATE SERPL-MCNC: 3.8 MG/DL (ref 2.5–4.9)
POTASSIUM SERPL-SCNC: 3.9 MMOL/L (ref 3.5–5.5)
PROT UR-MCNC: 36 MG/DL
PROT/CREAT UR-RTO: 0.2
SODIUM SERPL-SCNC: 140 MMOL/L (ref 136–145)

## 2023-02-11 PROCEDURE — 36415 COLL VENOUS BLD VENIPUNCTURE: CPT

## 2023-02-11 PROCEDURE — 80069 RENAL FUNCTION PANEL: CPT

## 2023-02-11 PROCEDURE — 83735 ASSAY OF MAGNESIUM: CPT

## 2023-02-11 PROCEDURE — 82088 ASSAY OF ALDOSTERONE: CPT

## 2023-02-11 PROCEDURE — 84156 ASSAY OF PROTEIN URINE: CPT

## 2023-02-18 LAB — ALDOST SERPL-MCNC: 10.5 NG/DL (ref 0–30)

## 2023-06-23 ENCOUNTER — TRANSCRIBE ORDERS (OUTPATIENT)
Facility: HOSPITAL | Age: 58
End: 2023-06-23

## 2023-06-23 DIAGNOSIS — Z12.31 VISIT FOR SCREENING MAMMOGRAM: Primary | ICD-10-CM

## 2023-07-20 ENCOUNTER — HOSPITAL ENCOUNTER (OUTPATIENT)
Facility: HOSPITAL | Age: 58
Discharge: HOME | End: 2023-07-20
Attending: OBSTETRICS & GYNECOLOGY
Payer: COMMERCIAL

## 2023-07-20 DIAGNOSIS — Z12.31 VISIT FOR SCREENING MAMMOGRAM: ICD-10-CM

## 2023-07-20 PROCEDURE — 77063 BREAST TOMOSYNTHESIS BI: CPT

## 2024-08-09 ENCOUNTER — HOSPITAL ENCOUNTER (OUTPATIENT)
Facility: HOSPITAL | Age: 59
Discharge: HOME | End: 2024-08-09
Attending: OBSTETRICS & GYNECOLOGY
Payer: COMMERCIAL

## 2024-08-09 VITALS — BODY MASS INDEX: 44.16 KG/M2 | HEIGHT: 62 IN | WEIGHT: 240 LBS

## 2024-08-09 DIAGNOSIS — Z12.31 VISIT FOR SCREENING MAMMOGRAM: ICD-10-CM

## 2024-08-09 PROCEDURE — 77067 SCR MAMMO BI INCL CAD: CPT

## 2025-08-11 ENCOUNTER — HOSPITAL ENCOUNTER (OUTPATIENT)
Facility: HOSPITAL | Age: 60
Discharge: HOME | End: 2025-08-14
Attending: OBSTETRICS & GYNECOLOGY

## 2025-08-11 VITALS — HEIGHT: 62 IN | BODY MASS INDEX: 42.69 KG/M2 | WEIGHT: 232 LBS

## 2025-08-11 DIAGNOSIS — Z12.31 VISIT FOR SCREENING MAMMOGRAM: ICD-10-CM

## 2025-09-04 ENCOUNTER — TELEPHONE (OUTPATIENT)
Facility: HOSPITAL | Age: 60
End: 2025-09-04

## 2025-09-04 ENCOUNTER — CLINICAL DOCUMENTATION (OUTPATIENT)
Facility: HOSPITAL | Age: 60
End: 2025-09-04

## (undated) DEVICE — SNARE VASC L240CM LOOP W10MM SHTH DIA2.4MM RND STIFF CLD

## (undated) DEVICE — GAUZE,SPONGE,4"X4",16PLY,STRL,LF,10/TRAY: Brand: MEDLINE

## (undated) DEVICE — SYRINGE MED 25GA 3ML L5/8IN SUBQ PLAS W/ DETACH NDL SFTY

## (undated) DEVICE — CANNULA ORIG TL CLR W FOAM CUSHIONS AND 14FT SUPL TB 3 CHN

## (undated) DEVICE — AIRLIFE™ NASAL OXYGEN CANNULA CURVED, NONFLARED TIP WITH 14 FOOT (4.3 M) CRUSH-RESISTANT TUBING, OVER-THE-EAR STYLE: Brand: AIRLIFE™

## (undated) DEVICE — FLUFF AND POLYMER UNDERPAD,EXTRA HEAVY: Brand: WINGS

## (undated) DEVICE — SYR 50ML SLIP TIP NSAF LF STRL --

## (undated) DEVICE — FLEX ADVANTAGE 3000CC: Brand: FLEX ADVANTAGE

## (undated) DEVICE — TRAP SPEC COLL POLYP POLYSTYR --

## (undated) DEVICE — GOWN ISOL IMPERV UNIV, DISP, OPEN BACK, BLUE --

## (undated) DEVICE — SOLUTION IRRIG 1000ML H2O STRL BLT

## (undated) DEVICE — ENDOSCOPY PUMP TUBING/ CAP SET: Brand: ERBE

## (undated) DEVICE — SYR 20ML LL STRL LF --

## (undated) DEVICE — MEDI-VAC SUCTION HIGH CAPACITY: Brand: CARDINAL HEALTH

## (undated) DEVICE — MEDI-VAC NON-CONDUCTIVE SUCTION TUBING: Brand: CARDINAL HEALTH

## (undated) DEVICE — CATHETER SUCT TR FL TIP 14FR W/ O CTRL

## (undated) DEVICE — SYR 10ML LUER LOK 1/5ML GRAD --